# Patient Record
Sex: FEMALE | Race: BLACK OR AFRICAN AMERICAN | Employment: OTHER | URBAN - METROPOLITAN AREA
[De-identification: names, ages, dates, MRNs, and addresses within clinical notes are randomized per-mention and may not be internally consistent; named-entity substitution may affect disease eponyms.]

---

## 2018-06-17 ENCOUNTER — HOSPITAL ENCOUNTER (EMERGENCY)
Age: 68
Discharge: HOME | End: 2018-06-17
Attending: EMERGENCY MEDICINE
Payer: MEDICARE

## 2018-06-17 ENCOUNTER — APPOINTMENT (OUTPATIENT)
Dept: GENERAL RADIOLOGY | Age: 68
End: 2018-06-17
Attending: EMERGENCY MEDICINE
Payer: MEDICARE

## 2018-06-17 VITALS
TEMPERATURE: 98 F | WEIGHT: 237.22 LBS | OXYGEN SATURATION: 95 % | RESPIRATION RATE: 18 BRPM | DIASTOLIC BLOOD PRESSURE: 90 MMHG | HEART RATE: 80 BPM | BODY MASS INDEX: 42.02 KG/M2 | SYSTOLIC BLOOD PRESSURE: 179 MMHG

## 2018-06-17 DIAGNOSIS — M79.672 LEFT FOOT PAIN: Primary | ICD-10-CM

## 2018-06-17 PROCEDURE — 99283 EMERGENCY DEPT VISIT LOW MDM: CPT

## 2018-06-17 PROCEDURE — 74011250637 HC RX REV CODE- 250/637: Performed by: EMERGENCY MEDICINE

## 2018-06-17 PROCEDURE — 73630 X-RAY EXAM OF FOOT: CPT

## 2018-06-17 RX ORDER — SIMVASTATIN 10 MG/1
20 TABLET, FILM COATED ORAL
COMMUNITY

## 2018-06-17 RX ORDER — ACETAMINOPHEN 500 MG
1000 TABLET ORAL
Status: COMPLETED | OUTPATIENT
Start: 2018-06-17 | End: 2018-06-17

## 2018-06-17 RX ORDER — METFORMIN HYDROCHLORIDE 500 MG/1
500 TABLET ORAL
Status: ON HOLD | COMMUNITY
End: 2020-10-07 | Stop reason: SDUPTHER

## 2018-06-17 RX ORDER — POTASSIUM CHLORIDE 750 MG/1
10 TABLET, FILM COATED, EXTENDED RELEASE ORAL 2 TIMES DAILY
COMMUNITY

## 2018-06-17 RX ADMIN — ACETAMINOPHEN 1000 MG: 500 TABLET ORAL at 00:31

## 2018-06-17 NOTE — ED TRIAGE NOTES
Patient complains of posterior left foot pain x 3 weeks. Symptoms started after \"stepping\" wrong per patient.   Patient ambulatory with use of cane on arrival.

## 2018-06-17 NOTE — DISCHARGE INSTRUCTIONS
Foot Pain: Care Instructions  Your Care Instructions  Foot injuries that cause pain and swelling are fairly common. Almost all sports or home repair projects can cause a misstep that ends up as foot pain. Normal wear and tear, especially as you get older, also can cause foot pain. Most minor foot injuries will heal on their own, and home treatment is usually all you need to do. If you have a severe injury, you may need tests and treatment. Follow-up care is a key part of your treatment and safety. Be sure to make and go to all appointments, and call your doctor if you are having problems. It's also a good idea to know your test results and keep a list of the medicines you take. How can you care for yourself at home? · Take pain medicines exactly as directed. ¨ If the doctor gave you a prescription medicine for pain, take it as prescribed. ¨ If you are not taking a prescription pain medicine, ask your doctor if you can take an over-the-counter medicine. · Rest and protect your foot. Take a break from any activity that may cause pain. · Put ice or a cold pack on your foot for 10 to 20 minutes at a time. Put a thin cloth between the ice and your skin. · Prop up the sore foot on a pillow when you ice it or anytime you sit or lie down during the next 3 days. Try to keep it above the level of your heart. This will help reduce swelling. · Your doctor may recommend that you wrap your foot with an elastic bandage. Keep your foot wrapped for as long as your doctor advises. · If your doctor recommends crutches, use them as directed. · Wear roomy footwear. · As soon as pain and swelling end, begin gentle exercises of your foot. Your doctor can tell you which exercises will help. When should you call for help? Call 911 anytime you think you may need emergency care. For example, call if:  ? · Your foot turns pale, white, blue, or cold.    ?Call your doctor now or seek immediate medical care if:  ? · You cannot move or stand on your foot. ? · Your foot looks twisted or out of its normal position. ? · Your foot is not stable when you step down. ? · You have signs of infection, such as:  ¨ Increased pain, swelling, warmth, or redness. ¨ Red streaks leading from the sore area. ¨ Pus draining from a place on your foot. ¨ A fever. ? · Your foot is numb or tingly. ? Watch closely for changes in your health, and be sure to contact your doctor if:  ? · You do not get better as expected. ? · You have bruises from an injury that last longer than 2 weeks. Where can you learn more? Go to http://gwen-sunshine.info/. Enter L189 in the search box to learn more about \"Foot Pain: Care Instructions. \"  Current as of: March 21, 2017  Content Version: 11.4  © 6270-3543 [employer]. Care instructions adapted under license by [employer] (which disclaims liability or warranty for this information). If you have questions about a medical condition or this instruction, always ask your healthcare professional. [address] any warranty or liability for your use of this information.

## 2018-06-17 NOTE — ED NOTES
The patient was discharged home by family  in stable condition. The patient is alert and oriented, in no respiratory distress and discharge vital signs obtained. The patient's diagnosis, condition and treatment were explained. The patient expressed understanding. No prescriptions given. No work/school note given. A discharge plan has been developed. A  was not involved in the process. Aftercare instructions were given. Pt ambulatory out of the ED.

## 2018-06-17 NOTE — ED PROVIDER NOTES
HPI Comments: Bart Aase is a 75 yo F with left foot pain for 3 weeks. She states that 3 weeks ago she felt and heard a pop in her foot as she was stepping down her front steps. Since then she has been having increasing pain. The pain is located on the medial side of her distal 1st metatarsal in the same area of prior bunion surgery 13 years ago. The pain is increased with prolonged walking and improved with elevation. After the original injury occurred she traveled to [facility] for vacation. She states that she tried to keep her foot elevated as much as possible but had done a lot of walking which increased her pain. She has not taken any OTC pain medications because she was not sure what would be safe with her other medications. Past Medical History:   Diagnosis Date    Acid indigestion     or heartburn    Asthma     Constipation     Frequent episodic tension-type headache     Heart disease     High blood pressure     Multiple stiff joints     Shortness of breath     Sinus pressure     Sore throat     Trouble in sleeping     Wheezing        Past Surgical History:   Procedure Laterality Date    BIOPSY BREAST      ENDOSCOPY, COLON, DIAGNOSTIC  05/09         Family History:   Problem Relation Age of Onset    Breast Cancer Mother     Hypertension Mother     Diabetes Father     Coronary Artery Disease Sister     Breast Cancer Sister     Hypertension Brother     Heart Attack Maternal Grandmother     Dementia Paternal Grandmother     Hypertension Brother     Cancer Brother      Pancreas & Liver ca       Social History     Social History    Marital status:      Spouse name: N/A    Number of children: N/A    Years of education: N/A     Occupational History    Not on file.      Social History Main Topics    Smoking status: Never Smoker    Smokeless tobacco: Never Used    Alcohol use No    Drug use: No    Sexual activity: Yes     Partners: Male     Other Topics Concern    Not on file     Social History Narrative         ALLERGIES: Pork/porcine containing products and Pcn [penicillins]    Review of Systems   Constitutional: Negative for fever. HENT: Negative for sore throat. Eyes: Negative for visual disturbance. Respiratory: Negative for cough. Cardiovascular: Negative for chest pain. Gastrointestinal: Negative for abdominal pain. Genitourinary: Negative for dysuria. Musculoskeletal: Positive for arthralgias. Negative for back pain. Skin: Negative for color change and rash. Neurological: Negative for headaches. Vitals:    06/17/18 0015   BP: 179/90   Pulse: 80   Resp: 18   Temp: 98 °F (36.7 °C)   SpO2: 95%   Weight: 107.6 kg (237 lb 3.4 oz)            Physical Exam   Constitutional: She appears well-developed and well-nourished. No distress. HENT:   Head: Normocephalic and atraumatic. Mouth/Throat: Oropharynx is clear and moist.   Eyes: Conjunctivae and EOM are normal.   Neck: Normal range of motion and phonation normal.   Cardiovascular: Normal rate and intact distal pulses. Pulmonary/Chest: Effort normal. No respiratory distress. Abdominal: She exhibits no distension. Musculoskeletal: Normal range of motion. She exhibits no tenderness. Left foot: There is bony tenderness (distal 1st metatarsal). Neurological: She is alert. She is not disoriented. She exhibits normal muscle tone. Skin: Skin is warm and dry. No erythema. Nursing note and vitals reviewed.        MDM      ED Course       Procedures

## 2018-08-18 ENCOUNTER — HOSPITAL ENCOUNTER (EMERGENCY)
Age: 68
Discharge: HOME | End: 2018-08-18
Attending: EMERGENCY MEDICINE | Admitting: EMERGENCY MEDICINE
Payer: MEDICARE

## 2018-08-18 ENCOUNTER — APPOINTMENT (OUTPATIENT)
Dept: GENERAL RADIOLOGY | Age: 68
End: 2018-08-18
Attending: EMERGENCY MEDICINE
Payer: MEDICARE

## 2018-08-18 VITALS
HEART RATE: 61 BPM | TEMPERATURE: 98 F | DIASTOLIC BLOOD PRESSURE: 74 MMHG | SYSTOLIC BLOOD PRESSURE: 138 MMHG | RESPIRATION RATE: 16 BRPM | HEIGHT: 63 IN | OXYGEN SATURATION: 91 % | BODY MASS INDEX: 42.62 KG/M2 | WEIGHT: 240.52 LBS

## 2018-08-18 DIAGNOSIS — R53.83 FATIGUE, UNSPECIFIED TYPE: Primary | ICD-10-CM

## 2018-08-18 DIAGNOSIS — M79.602 LEFT ARM PAIN: ICD-10-CM

## 2018-08-18 LAB
ALBUMIN SERPL-MCNC: 3.4 G/DL (ref 3.5–5)
ALBUMIN/GLOB SERPL: 0.9 {RATIO} (ref 1.1–2.2)
ALP SERPL-CCNC: 84 U/L (ref 45–117)
ALT SERPL-CCNC: 31 U/L (ref 12–78)
ANION GAP SERPL CALC-SCNC: 6 MMOL/L (ref 5–15)
AST SERPL-CCNC: 15 U/L (ref 15–37)
BASOPHILS # BLD: 0.1 K/UL (ref 0–0.1)
BASOPHILS NFR BLD: 1 % (ref 0–1)
BILIRUB SERPL-MCNC: 0.3 MG/DL (ref 0.2–1)
BUN SERPL-MCNC: 15 MG/DL (ref 6–20)
BUN/CREAT SERPL: 16 (ref 12–20)
CALCIUM SERPL-MCNC: 9.4 MG/DL (ref 8.5–10.1)
CHLORIDE SERPL-SCNC: 104 MMOL/L (ref 97–108)
CO2 SERPL-SCNC: 30 MMOL/L (ref 21–32)
COMMENT, HOLDF: NORMAL
CREAT SERPL-MCNC: 0.92 MG/DL (ref 0.55–1.02)
DIFFERENTIAL METHOD BLD: NORMAL
EOSINOPHIL # BLD: 0.4 K/UL (ref 0–0.4)
EOSINOPHIL NFR BLD: 4 % (ref 0–7)
ERYTHROCYTE [DISTWIDTH] IN BLOOD BY AUTOMATED COUNT: 13.5 % (ref 11.5–14.5)
GLOBULIN SER CALC-MCNC: 3.8 G/DL (ref 2–4)
GLUCOSE SERPL-MCNC: 103 MG/DL (ref 65–100)
HCT VFR BLD AUTO: 41.6 % (ref 35–47)
HGB BLD-MCNC: 14.5 G/DL (ref 11.5–16)
LYMPHOCYTES # BLD: 3.3 K/UL (ref 0.8–3.5)
LYMPHOCYTES NFR BLD: 35 % (ref 12–49)
MCH RBC QN AUTO: 31 PG (ref 26–34)
MCHC RBC AUTO-ENTMCNC: 34.9 G/DL (ref 30–36.5)
MCV RBC AUTO: 89.1 FL (ref 80–99)
MONOCYTES # BLD: 0.8 K/UL (ref 0–1)
MONOCYTES NFR BLD: 9 % (ref 5–13)
NEUTS SEG # BLD: 4.9 K/UL (ref 1.8–8)
NEUTS SEG NFR BLD: 51 % (ref 32–75)
PLATELET # BLD AUTO: 285 K/UL (ref 150–400)
PMV BLD AUTO: 10.6 FL (ref 8.9–12.9)
POTASSIUM SERPL-SCNC: 3.3 MMOL/L (ref 3.5–5.1)
PROT SERPL-MCNC: 7.2 G/DL (ref 6.4–8.2)
RBC # BLD AUTO: 4.67 M/UL (ref 3.8–5.2)
SAMPLES BEING HELD,HOLD: NORMAL
SODIUM SERPL-SCNC: 140 MMOL/L (ref 136–145)
TROPONIN I SERPL-MCNC: <0.05 NG/ML
WBC # BLD AUTO: 9.4 K/UL (ref 3.6–11)
XXWBCSUS: 0

## 2018-08-18 PROCEDURE — 80053 COMPREHEN METABOLIC PANEL: CPT | Performed by: EMERGENCY MEDICINE

## 2018-08-18 PROCEDURE — 71046 X-RAY EXAM CHEST 2 VIEWS: CPT

## 2018-08-18 PROCEDURE — 36415 COLL VENOUS BLD VENIPUNCTURE: CPT | Performed by: EMERGENCY MEDICINE

## 2018-08-18 PROCEDURE — 99285 EMERGENCY DEPT VISIT HI MDM: CPT

## 2018-08-18 PROCEDURE — 93005 ELECTROCARDIOGRAM TRACING: CPT

## 2018-08-18 PROCEDURE — 85025 COMPLETE CBC W/AUTO DIFF WBC: CPT | Performed by: EMERGENCY MEDICINE

## 2018-08-18 PROCEDURE — 84484 ASSAY OF TROPONIN QUANT: CPT | Performed by: EMERGENCY MEDICINE

## 2018-08-18 RX ORDER — OMEPRAZOLE 20 MG/1
20 CAPSULE, DELAYED RELEASE ORAL DAILY
COMMUNITY

## 2018-08-18 NOTE — ED TRIAGE NOTES
Pt ambulates to treatment area she states that for past week she has been feeling weak and fatigued. She states that for 3 days she has had pain in her left shoulder blade and then yesterday she had some throbbing pain in the left lower arm that caused her to take off her watch. She has had a small amount of SOB with the pain. Pt denies any chest pain, N/V or diaphoresis with the pain.

## 2018-08-18 NOTE — ED NOTES
Pt given discharge instructions by Dr Conner Echeverria she verbalizes an understanding pt stable at time of discharge ambulates to lobby with family

## 2018-08-18 NOTE — DISCHARGE INSTRUCTIONS
Fatigue: Care Instructions  Your Care Instructions    Fatigue is a feeling of tiredness, exhaustion, or lack of energy. You may feel fatigue because of too much or not enough activity. It can also come from stress, lack of sleep, boredom, and poor diet. Many medical problems, such as viral infections, can cause fatigue. Emotional problems, especially depression, are often the cause of fatigue. Fatigue is most often a symptom of another problem. Treatment for fatigue depends on the cause. For example, if you have fatigue because you have a certain health problem, treating this problem also treats your fatigue. If depression or anxiety is the cause, treatment may help. Follow-up care is a key part of your treatment and safety. Be sure to make and go to all appointments, and call your doctor if you are having problems. It's also a good idea to know your test results and keep a list of the medicines you take. How can you care for yourself at home? · Get regular exercise. But don't overdo it. Go back and forth between rest and exercise. · Get plenty of rest.  · Eat a healthy diet. Do not skip meals, especially breakfast.  · Reduce your use of caffeine, tobacco, and alcohol. Caffeine is most often found in coffee, tea, cola drinks, and chocolate. · Limit medicines that can cause fatigue. This includes tranquilizers and cold and allergy medicines. When should you call for help? Watch closely for changes in your health, and be sure to contact your doctor if:    · You have new symptoms such as fever or a rash.     · Your fatigue gets worse.     · You have been feeling down, depressed, or hopeless. Or you may have lost interest in things that you usually enjoy.     · You are not getting better as expected. Where can you learn more? Go to http://gwen-sunshine.info/. Enter Z457 in the search box to learn more about \"Fatigue: Care Instructions. \"  Current as of: November 20, 2017  Content Version: 11.7  © 3468-7201 [employer]. Care instructions adapted under license by [employer] (which disclaims liability or warranty for this information). If you have questions about a medical condition or this instruction, always ask your healthcare professional. [address] any warranty or liability for your use of this information.

## 2018-08-18 NOTE — ED PROVIDER NOTES
HPI Comments: Hx HTN, DM, hyperlipidemia, \"angina\", TIA, GERD, asthma; presents with a week long hx of fatigue; unclear etiology; she says she has felt this way in the past as well; she reports intermittent left arm pain since yesterday; she has been having fleeting, \"sticking\" chest pains; she has upper back pain; denies dyspnea. Her  just lost his sister and insisted patient come in because \"he didn't want to lose me too. \"  She reports a remote hx of a cardiac cath that showed no blockages. Patient is a 79 y.o. female presenting with fatigue. Fatigue          Past Medical History:   Diagnosis Date    Acid indigestion     or heartburn    Asthma     Constipation     Frequent episodic tension-type headache     Heart disease     High blood pressure     Multiple stiff joints     Shortness of breath     Sinus pressure     Sore throat     Trouble in sleeping     Wheezing        Past Surgical History:   Procedure Laterality Date    BIOPSY BREAST      ENDOSCOPY, COLON, DIAGNOSTIC  05/09         Family History:   Problem Relation Age of Onset    Breast Cancer Mother     Hypertension Mother     Diabetes Father     Coronary Artery Disease Sister     Breast Cancer Sister     Hypertension Brother     Heart Attack Maternal Grandmother     Dementia Paternal Grandmother     Hypertension Brother     Cancer Brother      Pancreas & Liver ca       Social History     Social History    Marital status:      Spouse name: N/A    Number of children: N/A    Years of education: N/A     Occupational History    Not on file.      Social History Main Topics    Smoking status: Never Smoker    Smokeless tobacco: Never Used    Alcohol use No    Drug use: No    Sexual activity: Yes     Partners: Male     Other Topics Concern    Not on file     Social History Narrative         ALLERGIES: Pork/porcine containing products and Pcn [penicillins]    Review of Systems   Constitutional: Positive for fatigue. All other systems reviewed and are negative. There were no vitals filed for this visit. Physical Exam   Constitutional: She is oriented to person, place, and time. She appears well-developed and well-nourished. No distress. Mildly tearful   HENT:   Head: Normocephalic and atraumatic. Eyes: Conjunctivae are normal. No scleral icterus. Neck: Neck supple. No tracheal deviation present. Cardiovascular: Normal rate, regular rhythm, normal heart sounds and intact distal pulses. Exam reveals no gallop and no friction rub. No murmur heard. Pulmonary/Chest: Effort normal and breath sounds normal. She has no wheezes. She has no rales. Abdominal: Soft. She exhibits no distension. There is no tenderness. There is no rebound and no guarding. Musculoskeletal: She exhibits no edema. Neurological: She is alert and oriented to person, place, and time. Skin: Skin is warm and dry. No rash noted. Psychiatric: She has a normal mood and affect. Nursing note and vitals reviewed. [facility]      ED Course       Procedures    EKG: NSR; rate - 66; normal ST,T.  Candace Larios MD  1:53 PM     Progress Note:  Results, treatment, and follow up plan have been discussed with patient. Questions were answered. Candace Larios MD  2:17 PM    A/P: fatigue and left arm pain - unclear etiology; no exertional sx's; reassuring appearance and exam; VSS; CBC, CMP, trop, EKG, CXR ok  home with PCP/cards f/u.   Candace Larios MD  2:18 PM

## 2018-08-22 LAB
ATRIAL RATE: 66 BPM
CALCULATED P AXIS, ECG09: 43 DEGREES
CALCULATED R AXIS, ECG10: -9 DEGREES
CALCULATED T AXIS, ECG11: 78 DEGREES
DIAGNOSIS, 93000: NORMAL
P-R INTERVAL, ECG05: 182 MS
Q-T INTERVAL, ECG07: 410 MS
QRS DURATION, ECG06: 92 MS
QTC CALCULATION (BEZET), ECG08: 429 MS
VENTRICULAR RATE, ECG03: 66 BPM

## 2018-09-17 ENCOUNTER — HOSPITAL ENCOUNTER (EMERGENCY)
Age: 68
Discharge: HOME | End: 2018-09-17
Attending: EMERGENCY MEDICINE
Payer: MEDICARE

## 2018-09-17 ENCOUNTER — APPOINTMENT (OUTPATIENT)
Dept: GENERAL RADIOLOGY | Age: 68
End: 2018-09-17
Attending: EMERGENCY MEDICINE
Payer: MEDICARE

## 2018-09-17 VITALS
TEMPERATURE: 98.6 F | WEIGHT: 238.1 LBS | RESPIRATION RATE: 16 BRPM | BODY MASS INDEX: 42.19 KG/M2 | OXYGEN SATURATION: 95 % | DIASTOLIC BLOOD PRESSURE: 81 MMHG | HEIGHT: 63 IN | SYSTOLIC BLOOD PRESSURE: 162 MMHG | HEART RATE: 71 BPM

## 2018-09-17 DIAGNOSIS — J20.9 ACUTE BRONCHITIS, UNSPECIFIED ORGANISM: Primary | ICD-10-CM

## 2018-09-17 DIAGNOSIS — J98.01 ACUTE BRONCHOSPASM: ICD-10-CM

## 2018-09-17 LAB
ALBUMIN SERPL-MCNC: 3.5 G/DL (ref 3.5–5)
ALBUMIN/GLOB SERPL: 0.8 {RATIO} (ref 1.1–2.2)
ALP SERPL-CCNC: 89 U/L (ref 45–117)
ALT SERPL-CCNC: 37 U/L (ref 12–78)
ANION GAP SERPL CALC-SCNC: 9 MMOL/L (ref 5–15)
APPEARANCE UR: CLEAR
AST SERPL-CCNC: 28 U/L (ref 15–37)
BACTERIA URNS QL MICRO: ABNORMAL /HPF
BASOPHILS # BLD: 0 K/UL (ref 0–0.1)
BASOPHILS NFR BLD: 0 % (ref 0–1)
BILIRUB SERPL-MCNC: 0.3 MG/DL (ref 0.2–1)
BILIRUB UR QL: NEGATIVE
BUN SERPL-MCNC: 14 MG/DL (ref 6–20)
BUN/CREAT SERPL: 13 (ref 12–20)
CALCIUM SERPL-MCNC: 9.5 MG/DL (ref 8.5–10.1)
CHLORIDE SERPL-SCNC: 103 MMOL/L (ref 97–108)
CO2 SERPL-SCNC: 29 MMOL/L (ref 21–32)
COLOR UR: ABNORMAL
CREAT SERPL-MCNC: 1.04 MG/DL (ref 0.55–1.02)
DIFFERENTIAL METHOD BLD: NORMAL
EOSINOPHIL # BLD: 0.4 K/UL (ref 0–0.4)
EOSINOPHIL NFR BLD: 4 % (ref 0–7)
EPITH CASTS URNS QL MICRO: ABNORMAL /LPF
ERYTHROCYTE [DISTWIDTH] IN BLOOD BY AUTOMATED COUNT: 13.4 % (ref 11.5–14.5)
GLOBULIN SER CALC-MCNC: 4.3 G/DL (ref 2–4)
GLUCOSE SERPL-MCNC: 99 MG/DL (ref 65–100)
GLUCOSE UR STRIP.AUTO-MCNC: NEGATIVE MG/DL
HCT VFR BLD AUTO: 41.7 % (ref 35–47)
HGB BLD-MCNC: 14.5 G/DL (ref 11.5–16)
HGB UR QL STRIP: NEGATIVE
KETONES UR QL STRIP.AUTO: NEGATIVE MG/DL
LEUKOCYTE ESTERASE UR QL STRIP.AUTO: ABNORMAL
LYMPHOCYTES # BLD: 2.9 K/UL (ref 0.8–3.5)
LYMPHOCYTES NFR BLD: 30 % (ref 12–49)
MCH RBC QN AUTO: 31.1 PG (ref 26–34)
MCHC RBC AUTO-ENTMCNC: 34.8 G/DL (ref 30–36.5)
MCV RBC AUTO: 89.5 FL (ref 80–99)
MONOCYTES # BLD: 1 K/UL (ref 0–1)
MONOCYTES NFR BLD: 10 % (ref 5–13)
NEUTS SEG # BLD: 5.5 K/UL (ref 1.8–8)
NEUTS SEG NFR BLD: 56 % (ref 32–75)
NITRITE UR QL STRIP.AUTO: NEGATIVE
PH UR STRIP: 6.5 [PH] (ref 5–8)
PLATELET # BLD AUTO: 292 K/UL (ref 150–400)
PMV BLD AUTO: 10.5 FL (ref 8.9–12.9)
POTASSIUM SERPL-SCNC: 3.6 MMOL/L (ref 3.5–5.1)
PROT SERPL-MCNC: 7.8 G/DL (ref 6.4–8.2)
PROT UR STRIP-MCNC: NEGATIVE MG/DL
RBC # BLD AUTO: 4.66 M/UL (ref 3.8–5.2)
RBC #/AREA URNS HPF: ABNORMAL /HPF (ref 0–5)
SODIUM SERPL-SCNC: 141 MMOL/L (ref 136–145)
SP GR UR REFRACTOMETRY: 1.01 (ref 1–1.03)
UA: UC IF INDICATED,UAUC: ABNORMAL
UROBILINOGEN UR QL STRIP.AUTO: 0.2 EU/DL (ref 0.2–1)
WBC # BLD AUTO: 9.8 K/UL (ref 3.6–11)
WBC URNS QL MICRO: ABNORMAL /HPF (ref 0–4)
XXWBCSUS: 0
YEAST BUDDING URNS QL: PRESENT

## 2018-09-17 PROCEDURE — A9270 NON-COVERED ITEM OR SERVICE: HCPCS | Performed by: EMERGENCY MEDICINE

## 2018-09-17 PROCEDURE — 85025 COMPLETE CBC W/AUTO DIFF WBC: CPT | Performed by: EMERGENCY MEDICINE

## 2018-09-17 PROCEDURE — 94640 AIRWAY INHALATION TREATMENT: CPT

## 2018-09-17 PROCEDURE — 36415 COLL VENOUS BLD VENIPUNCTURE: CPT | Performed by: EMERGENCY MEDICINE

## 2018-09-17 PROCEDURE — 87106 FUNGI IDENTIFICATION YEAST: CPT | Performed by: EMERGENCY MEDICINE

## 2018-09-17 PROCEDURE — 99284 EMERGENCY DEPT VISIT MOD MDM: CPT

## 2018-09-17 PROCEDURE — 71046 X-RAY EXAM CHEST 2 VIEWS: CPT

## 2018-09-17 PROCEDURE — 74011636637 HC RX REV CODE- 636/637: Performed by: EMERGENCY MEDICINE

## 2018-09-17 PROCEDURE — 87086 URINE CULTURE/COLONY COUNT: CPT | Performed by: EMERGENCY MEDICINE

## 2018-09-17 PROCEDURE — 81001 URINALYSIS AUTO W/SCOPE: CPT | Performed by: EMERGENCY MEDICINE

## 2018-09-17 PROCEDURE — 77030013140 HC MSK NEB VYRM -A

## 2018-09-17 PROCEDURE — 74011000250 HC RX REV CODE- 250: Performed by: EMERGENCY MEDICINE

## 2018-09-17 PROCEDURE — 80053 COMPREHEN METABOLIC PANEL: CPT | Performed by: EMERGENCY MEDICINE

## 2018-09-17 RX ORDER — CIPROFLOXACIN 500 MG/1
500 TABLET ORAL 2 TIMES DAILY
COMMUNITY
End: 2019-06-04

## 2018-09-17 RX ORDER — CARVEDILOL 25 MG/1
25 TABLET ORAL 2 TIMES DAILY WITH MEALS
COMMUNITY

## 2018-09-17 RX ORDER — PREDNISONE 20 MG/1
20 TABLET ORAL DAILY
Qty: 5 TAB | Refills: 0 | Status: SHIPPED | OUTPATIENT
Start: 2018-09-17 | End: 2018-09-22

## 2018-09-17 RX ORDER — PREDNISONE 20 MG/1
60 TABLET ORAL
Status: COMPLETED | OUTPATIENT
Start: 2018-09-17 | End: 2018-09-17

## 2018-09-17 RX ORDER — DOXYCYCLINE 100 MG/1
100 CAPSULE ORAL 2 TIMES DAILY
Qty: 14 CAP | Refills: 0 | Status: SHIPPED | OUTPATIENT
Start: 2018-09-17 | End: 2019-06-04

## 2018-09-17 RX ORDER — ALBUTEROL SULFATE 90 UG/1
2 AEROSOL, METERED RESPIRATORY (INHALATION)
Qty: 1 INHALER | Refills: 1 | Status: SHIPPED | OUTPATIENT
Start: 2018-09-17 | End: 2018-09-25

## 2018-09-17 RX ORDER — ALBUTEROL SULFATE 0.83 MG/ML
2.5 SOLUTION RESPIRATORY (INHALATION)
Status: COMPLETED | OUTPATIENT
Start: 2018-09-17 | End: 2018-09-17

## 2018-09-17 RX ADMIN — ALBUTEROL SULFATE 2.5 MG: 2.5 SOLUTION RESPIRATORY (INHALATION) at 11:21

## 2018-09-17 RX ADMIN — PREDNISONE 60 MG: 20 TABLET ORAL at 11:07

## 2018-09-17 RX ADMIN — IPRATROPIUM BROMIDE 1 DOSE: 0.5 SOLUTION RESPIRATORY (INHALATION) at 11:08

## 2018-09-17 NOTE — ED TRIAGE NOTES
Pt presents to ED with c/o 5 day hx of cough productive of yellow sputum. Pt is currently being treated for UTI. Pt with c/o dyspnea with exertion. Pt also with c/o nausea and vomiting last night. Pt describes post tussive vomiting.

## 2018-09-17 NOTE — ED NOTES
IV access established and blood samples obtained for ordered testing. Patient tolerated well. Patient voices no further requests or concerns at this time. Call bell in reach. Patient connected to monitor x3 and vital signs updated. Will continue to monitor.

## 2018-09-17 NOTE — ED PROVIDER NOTES
HPI Comments: The patient has a five-day history of cough and sinus congestion. She also has had 3 episodes of vomiting and is beginning to feel generalized weakness. She is unsure if she has had a fever. She has occasional sputum production. She denies chest pain, abdominal pain or shortness of breath. She also says that she has been treated for the past several days for a UTI with Cipro. She has some urinary frequency but no dysuria. Patient is a 79 y.o. female presenting with cough. Cough Associated symptoms include rhinorrhea, nausea and vomiting. Pertinent negatives include no chest pain, no headaches, no shortness of breath, no wheezing and no confusion. Past Medical History:  
Diagnosis Date  Acid indigestion   
 or heartburn  Asthma  Constipation  Diabetes ([address]  Frequent episodic tension-type headache   
 Heart disease  High blood pressure  Multiple stiff joints  Shortness of breath  Sinus pressure  Sore throat  Trouble in sleeping  Wheezing Past Surgical History:  
Procedure Laterality Date  BIOPSY BREAST  CARDIAC SURG PROCEDURE UNLIST    
 checked for blockages  ENDOSCOPY, COLON, DIAGNOSTIC  05/09 Family History:  
Problem Relation Age of Onset  Breast Cancer Mother  Hypertension Mother  Diabetes Father  Coronary Artery Disease Sister  Breast Cancer Sister  Hypertension Brother  Heart Attack Maternal Grandmother  Dementia Paternal Grandmother  Hypertension Brother  Cancer Brother Pancreas & Liver ca Social History Social History  Marital status:  Spouse name: N/A  
 Number of children: N/A  
 Years of education: N/A Occupational History  Not on file. Social History Main Topics  Smoking status: Never Smoker  Smokeless tobacco: Never Used  Alcohol use No  
 Drug use: No  
 Sexual activity: Yes  
  Partners: Male Other Topics Concern  Not on file Social History Narrative ALLERGIES: Pork/porcine containing products and Pcn [penicillins] Review of Systems Constitutional: Negative for fever. HENT: Positive for rhinorrhea. Eyes: Negative for visual disturbance. Respiratory: Positive for cough. Negative for shortness of breath and wheezing. Cardiovascular: Negative for chest pain and leg swelling. Gastrointestinal: Positive for nausea and vomiting. Negative for abdominal pain and diarrhea. Genitourinary: Positive for frequency. Negative for dysuria. Musculoskeletal: Negative. Negative for back pain and neck stiffness. Skin: Negative for rash. Neurological: Negative. Negative for syncope and headaches. Psychiatric/Behavioral: Negative for confusion. All other systems reviewed and are negative. Vitals:  
 09/17/18 1042 BP: (!) 205/90 Pulse: 80 Resp: 18 Temp: 97.9 °F (36.6 °C) SpO2: 97% Weight: 108 kg (238 lb 1.6 oz) Height: 5' 3\" (1.6 m) Physical Exam  
Constitutional: She appears well-developed and well-nourished. No distress. HENT:  
Head: Normocephalic. Eyes: Pupils are equal, round, and reactive to light. Neck: Normal range of motion. Cardiovascular: Normal rate and regular rhythm. No murmur heard. Pulmonary/Chest: Effort normal. No respiratory distress. She has wheezes. Abdominal: Soft. There is no tenderness. Musculoskeletal: Normal range of motion. She exhibits no edema. Neurological: She is alert. She has normal strength. No cranial nerve deficit. Skin: Skin is warm and dry. Psychiatric: She has a normal mood and affect. Her behavior is normal.  
Nursing note and vitals reviewed. [facility] 
 
 
ED Course Procedures 1230 pm- pt better. Wheezing minmal at this point. sats 95 %.

## 2018-09-17 NOTE — ED NOTES
Patient has completed neb treatments. Expiratory wheezes noted with rhonchi in bases. Improved lung sounds with medication administration noted.

## 2018-09-17 NOTE — DISCHARGE INSTRUCTIONS
Bronchitis: Care Instructions  Your Care Instructions    Bronchitis is inflammation of the bronchial tubes, which carry air to the lungs. The tubes swell and produce mucus, or phlegm. The mucus and inflamed bronchial tubes make you cough. You may have trouble breathing. Most cases of bronchitis are caused by viruses like those that cause colds. Antibiotics usually do not help and they may be harmful. Bronchitis usually develops rapidly and lasts about 2 to 3 weeks in otherwise healthy people. Follow-up care is a key part of your treatment and safety. Be sure to make and go to all appointments, and call your doctor if you are having problems. It's also a good idea to know your test results and keep a list of the medicines you take. How can you care for yourself at home? · Take all medicines exactly as prescribed. Call your doctor if you think you are having a problem with your medicine. · Get some extra rest.  · Take an over-the-counter pain medicine, such as acetaminophen (Tylenol), ibuprofen (Advil, Motrin), or naproxen (Aleve) to reduce fever and relieve body aches. Read and follow all instructions on the label. · Do not take two or more pain medicines at the same time unless the doctor told you to. Many pain medicines have acetaminophen, which is Tylenol. Too much acetaminophen (Tylenol) can be harmful. · Take an over-the-counter cough medicine that contains dextromethorphan to help quiet a dry, hacking cough so that you can sleep. Avoid cough medicines that have more than one active ingredient. Read and follow all instructions on the label. · Breathe moist air from a humidifier, hot shower, or sink filled with hot water. The heat and moisture will thin mucus so you can cough it out. · Do not smoke. Smoking can make bronchitis worse. If you need help quitting, talk to your doctor about stop-smoking programs and medicines. These can increase your chances of quitting for good.   When should you call for help? Call 911 anytime you think you may need emergency care. For example, call if:    · You have severe trouble breathing.    Call your doctor now or seek immediate medical care if:    · You have new or worse trouble breathing.     · You cough up dark brown or bloody mucus (sputum).     · You have a new or higher fever.     · You have a new rash.    Watch closely for changes in your health, and be sure to contact your doctor if:    · You cough more deeply or more often, especially if you notice more mucus or a change in the color of your mucus.     · You are not getting better as expected. Where can you learn more? Go to http://gwen-sunshine.info/. Enter H333 in the search box to learn more about \"Bronchitis: Care Instructions. \"  Current as of: December 6, 2017  Content Version: 11.7  © 9394-3176 [employer]. Care instructions adapted under license by [employer] (which disclaims liability or warranty for this information). If you have questions about a medical condition or this instruction, always ask your healthcare professional. [place] [address] any warranty or liability for your use of this information. Wheezing or Bronchoconstriction: Care Instructions  Your Care Instructions  Wheezing is a whistling noise made during breathing. It occurs when the small airways, or bronchial tubes, that lead to your lungs swell or contract (spasm) and become narrow. This narrowing is called bronchoconstriction. When your airways constrict, it is hard for air to pass through and this makes it hard for you to breathe. Wheezing and bronchoconstriction can be caused by many problems, including:  · An infection such as the flu or a cold. · Allergies such as hay fever. · Diseases such as asthma or chronic obstructive pulmonary disease. · Smoking. Treatment for your wheezing depends on what is causing the problem.  Your wheezing may get better without treatment. But you may need to pay attention to things that cause your wheezing and avoid them. Or you may need medicine to help treat the wheezing and to reduce the swelling or to relieve spasms in your lungs. Follow-up care is a key part of your treatment and safety. Be sure to make and go to all appointments, and call your doctor if you are having problems. It is also a good idea to know your test results and keep a list of the medicines you take. How can you care for yourself at home? · Take your medicine exactly as prescribed. Call your doctor if you think you are having a problem with your medicine. You will get more details on the specific medicine your doctor prescribes. · If your doctor prescribed antibiotics, take them as directed. Do not stop taking them just because you feel better. You need to take the full course of antibiotics. · Breathe moist air from a humidifier, hot shower, or sink filled with hot water. This may help ease your symptoms and make it easier for you to breathe. · If you have congestion in your nose and throat, drinking plenty of fluids, especially hot fluids, may help relieve your symptoms. If you have kidney, heart, or liver disease and have to limit fluids, talk with your doctor before you increase the amount of fluids you drink. · If you have mucus in your airways, it may help to breathe deeply and cough. · Do not smoke or allow others to smoke around you. Smoking can make your wheezing worse. If you need help quitting, talk to your doctor about stop-smoking programs and medicines. These can increase your chances of quitting for good. · Avoid things that may cause your wheezing. These may include colds, smoke, air pollution, dust, pollen, pets, cockroaches, stress, and cold air. When should you call for help? Call 911 anytime you think you may need emergency care. For example, call if:    · You have severe trouble breathing.     · You passed out (lost consciousness).  Call your doctor now or seek immediate medical care if:    · You cough up yellow, dark brown, or bloody mucus (sputum).     · You have new or worse shortness of breath.     · Your wheezing is not getting better or it gets worse after you start taking your medicine.    Watch closely for changes in your health, and be sure to contact your doctor if:    · You do not get better as expected. Where can you learn more? Go to http://gwen-sunshine.info/. Enter 454 4558 in the search box to learn more about \"Wheezing or Bronchoconstriction: Care Instructions. \"  Current as of: December 6, 2017  Content Version: 11.7  © 8709-5185 [employer]. Care instructions adapted under license by [employer] (which disclaims liability or warranty for this information). If you have questions about a medical condition or this instruction, always ask your healthcare professional. [address] any warranty or liability for your use of this information.

## 2018-09-17 NOTE — ED NOTES
The patient was discharged home by Dr. Hector Corado in stable condition. The patient is alert and oriented, in no respiratory distress and discharge vital signs obtained. The patient's diagnosis, condition and treatment were explained. The patient expressed understanding. Three prescriptions given. No work/school note given. A discharge plan has been developed. A  was not involved in the process. Aftercare instructions were given. Pt ambulatory out of the ED. 2. The status of comorbities. (See ED/admit documents)

## 2018-09-17 NOTE — ED NOTES
Patient ambulatory to restroom to obtain ordered urine sample. Gait steady. Patient updated regarding plan of care and associated time constraints. Patient verbalizes understanding and agreement. Call bell in reach. Will continue to monitor.

## 2018-09-20 LAB
BACTERIA SPEC CULT: ABNORMAL
CC UR VC: ABNORMAL
SERVICE CMNT-IMP: ABNORMAL

## 2018-09-25 ENCOUNTER — OFFICE VISIT (OUTPATIENT)
Dept: CARDIOLOGY CLINIC | Age: 68
End: 2018-09-25

## 2018-09-25 VITALS
HEIGHT: 63 IN | BODY MASS INDEX: 42.35 KG/M2 | DIASTOLIC BLOOD PRESSURE: 90 MMHG | RESPIRATION RATE: 16 BRPM | WEIGHT: 239 LBS | OXYGEN SATURATION: 97 % | SYSTOLIC BLOOD PRESSURE: 140 MMHG | HEART RATE: 72 BPM

## 2018-09-25 DIAGNOSIS — I10 ESSENTIAL HYPERTENSION: ICD-10-CM

## 2018-09-25 DIAGNOSIS — G47.33 OSA (OBSTRUCTIVE SLEEP APNEA): ICD-10-CM

## 2018-09-25 DIAGNOSIS — R07.89 CHEST DISCOMFORT: Primary | ICD-10-CM

## 2018-09-25 DIAGNOSIS — E11.9 CONTROLLED TYPE 2 DIABETES MELLITUS WITHOUT COMPLICATION, WITHOUT LONG-TERM CURRENT USE OF INSULIN (HCC): ICD-10-CM

## 2018-09-25 RX ORDER — CYCLOBENZAPRINE HCL 10 MG
TABLET ORAL
COMMUNITY

## 2018-09-25 RX ORDER — TRIAMCINOLONE ACETONIDE 1 MG/G
CREAM TOPICAL 2 TIMES DAILY
COMMUNITY
End: 2020-05-19

## 2018-09-25 NOTE — PROGRESS NOTES
Patient has no cardiac complaints today.     Visit Vitals    /90 (BP 1 Location: Right arm, BP Patient Position: Sitting)    Pulse 72    Resp 16    Ht 5' 3\" (1.6 m)    Wt 239 lb (108.4 kg)    SpO2 97%    BMI 42.34 kg/m2       Patient was in the ER 9/17/18

## 2018-09-25 NOTE — PROGRESS NOTES
LAST OFFICE VISIT : Visit date not found      No diagnosis found. Marielena Sharma is a 79 y.o. female with HTN, angina, asthma, elevated cholesterol and obesity referred for follow up from ED. Cardiac risk factors: elevated cholesterol, obesity, hypertension  I have personally obtained the history from the patient. HISTORY OF PRESENTING ILLNESS     She was in ED for fatigue. Her BP in ED was 205/90. Was seen for fatigue and left arm pain,  \"sticky sensation in upper back. \"  Cardiac cath of coronary artery. Troponin was negative at the time as well as her chest X-Ray. Her EKG at ED was sinus rhythm. Last cardiac test was in 2013. Pt has seen a cardiologist for chest pain 6 to 7 years ago, but is unsure of who her cardiologist was. Pt states that she felt an abnormal sensation on her chest when she was in ED, but she is unsure of how to describe it. Pt uses CPAP religiously for her sleep apnea but has stopped using it two weeks ago as she states she was congested. Pt was on prednisone and antibiotics for her upper respiratory infection. Pt cannot recall her last HB A1C level but has had it checked. Pt has been taking medication for diabetes for two years. Pt states that her diabetes has been under control according to her PCP. Pt had a physical exam and blood work recently. Pt does not exercise and she is retired, where her only physical activity is to take care of her grandson. Pt does not eat healthy. The patient denies shortness of breath, orthopnea, PND, LE edema, palpitations, syncope, presyncope or fatigue.          ACTIVE PROBLEM LIST     Patient Active Problem List    Diagnosis Date Noted    HTN (hypertension) 07/06/2011    Angina 07/06/2011    Asthma 07/06/2011    Obesity 07/06/2011    Elevated cholesterol 07/06/2011    HSV-2 (herpes simplex virus 2) infection 07/06/2011    Osteopenia 07/06/2011           PAST MEDICAL HISTORY     Past Medical History:   Diagnosis Date    Acid indigestion     or heartburn    Asthma     Constipation     Diabetes ([facility] [address]     Frequent episodic tension-type headache     Heart disease     High blood pressure     Multiple stiff joints     Shortness of breath     Sinus pressure     Sore throat     Trouble in sleeping     Wheezing            PAST SURGICAL HISTORY     Past Surgical History:   Procedure Laterality Date    BIOPSY BREAST      CARDIAC SURG PROCEDURE UNLIST      checked for blockages    ENDOSCOPY, COLON, DIAGNOSTIC  05/09          ALLERGIES     Allergies   Allergen Reactions    Pork/Porcine Containing Products Angioedema    Pcn [Penicillins] Rash          FAMILY HISTORY     Family History   Problem Relation Age of Onset    Breast Cancer Mother     Hypertension Mother     Diabetes Father     Coronary Artery Disease Sister     Breast Cancer Sister     Hypertension Brother     Heart Attack Maternal Grandmother     Dementia Paternal Grandmother     Hypertension Brother     Cancer Brother      Pancreas & Liver ca    negative for cardiac disease       SOCIAL HISTORY     Social History     Social History    Marital status:      Spouse name: N/A    Number of children: N/A    Years of education: N/A     Social History Main Topics    Smoking status: Never Smoker    Smokeless tobacco: Never Used    Alcohol use No    Drug use: No    Sexual activity: Yes     Partners: Male     Other Topics Concern    None     Social History Narrative         MEDICATIONS     Current Outpatient Prescriptions   Medication Sig    cyclobenzaprine (FLEXERIL) 10 mg tablet Take  by mouth three (3) times daily as needed for Muscle Spasm(s).  triamcinolone acetonide (KENALOG) 0.1 % topical cream Apply  to affected area two (2) times a day. use thin layer    B.infantis-B.ani-B.long-B.bifi (PROBIOTIC 4X) 10-15 mg TbEC Take  by mouth.  omega 3-dha-epa-fish oil 100-160-1,000 mg cap Take  by mouth.     carvedilol (COREG) 25 mg tablet Take 25 mg by mouth two (2) times daily (with meals).  albuterol (PROVENTIL HFA, VENTOLIN HFA, PROAIR HFA) 90 mcg/actuation inhaler Take 2 Puffs by inhalation every six (6) hours as needed for Wheezing for up to 7 days.  omeprazole (PRILOSEC) 20 mg capsule Take 20 mg by mouth daily.  simvastatin (ZOCOR) 10 mg tablet Take 20 mg by mouth nightly.  metFORMIN (GLUCOPHAGE) 500 mg tablet Take 500 mg by mouth daily (with breakfast).  potassium chloride SR (KLOR-CON 10) 10 mEq tablet Take 10 mEq by mouth two (2) times a day.  nortriptyline (PAMELOR) 25 mg capsule Take 1 Cap by mouth nightly.  diltiazem CD (CARDIZEM CD) 240 mg ER capsule Take 300 mg by mouth daily.  lisinopril-hydrochlorothiazide (PRINZIDE, ZESTORETIC) 20-12.5 mg per tablet Take 2 Tabs by mouth daily.  Cholecalciferol, Vitamin D3, (VITAMIN D) 1,000 unit Cap Take 2,000 Units by mouth.  aspirin 81 mg tablet Take 81 mg by mouth.  ciprofloxacin HCl (CIPRO) 500 mg tablet Take 500 mg by mouth two (2) times a day.  doxycycline (MONODOX) 100 mg capsule Take 1 Cap by mouth two (2) times a day. No current facility-administered medications for this visit. I have reviewed the nurses notes, vitals, problem list, allergy list, medical history, family, social history and medications. REVIEW OF SYMPTOMS      General: Pt denies excessive weight gain or loss. Pt is able to conduct ADL's  HEENT: Denies blurred vision, headaches, hearing loss, epistaxis and difficulty swallowing. Respiratory: Denies cough, congestion, shortness of breath, BERMUDEZ, wheezing or stridor.   Cardiovascular: Denies precordial pain, palpitations, edema or PND  Gastrointestinal: Denies poor appetite, indigestion, abdominal pain or blood in stool  Genitourinary: Denies hematuria, dysuria, increased urinary frequency  Musculoskeletal: Denies joint pain or swelling from muscles or joints  Neurologic: Denies tremor, paresthesias, headache, or sensory motor disturbance  Psychiatric: Denies confusion, insomnia, depression  Integumentray: Denies rash, itching or ulcers. Hematologic: Denies easy bruising, bleeding     PHYSICAL EXAMINATION      Vitals:    09/25/18 0950   BP: 140/90   Pulse: 72   Resp: 16   SpO2: 97%   Weight: 239 lb (108.4 kg)   Height: 5' 3\" (1.6 m)   BP recheck:   General: Well developed, in no acute distress. overweight  HEENT: No jaundice, oral mucosa moist, no oral ulcers  Neck: Supple, no stiffness, no lymphadenopathy, supple  Heart:  Normal S1/S2 negative S3 or S4. Regular, no murmur, gallop or rub, no jugular venous distention  Respiratory: Clear bilaterally x 4, no wheezing or rales  Abdomen:   Soft, non-tender, bowel sounds are active.   Extremities:  No edema, normal cap refill, no cyanosis. Musculoskeletal: No clubbing, no deformities  Neuro: A&Ox3, speech clear, gait stable, cooperative, no focal neurologic deficits  Skin: Skin color is normal. No rashes or lesions. Non diaphoretic, moist.  Vascular: 2+ pulses symmetric in all extremities        EKG: Normal sinus rhythm     DIAGNOSTIC DATA     1. Stress Test  3/7/13- NM stress- no ischemia, EF 68%    2. Echo  2/25/13- EF 55%, MV mild annular calcification, TR mild, PAP 35 mmHg    3. LE Doppler  5/10/18-  No evidence of right lower extremity vein thrombosis.          LABORATORY DATA            Lab Results   Component Value Date/Time    WBC 9.8 09/17/2018 11:13 AM    HGB 14.5 09/17/2018 11:13 AM    HCT 41.7 09/17/2018 11:13 AM    PLATELET 226 86/97/9809 11:13 AM    MCV 89.5 09/17/2018 11:13 AM      Lab Results   Component Value Date/Time    Sodium 141 09/17/2018 11:13 AM    Potassium 3.6 09/17/2018 11:13 AM    Chloride 103 09/17/2018 11:13 AM    CO2 29 09/17/2018 11:13 AM    Anion gap 9 09/17/2018 11:13 AM    Glucose 99 09/17/2018 11:13 AM    BUN 14 09/17/2018 11:13 AM    Creatinine 1.04 (H) 09/17/2018 11:13 AM    BUN/Creatinine ratio 13 09/17/2018 11:13 AM    GFR est AA >60 09/17/2018 11:13 AM GFR est non-AA 53 (L) 09/17/2018 11:13 AM    Calcium 9.5 09/17/2018 11:13 AM    Bilirubin, total 0.3 09/17/2018 11:13 AM    AST (SGOT) 28 09/17/2018 11:13 AM    Alk. phosphatase 89 09/17/2018 11:13 AM    Protein, total 7.8 09/17/2018 11:13 AM    Albumin 3.5 09/17/2018 11:13 AM    Globulin 4.3 (H) 09/17/2018 11:13 AM    A-G Ratio 0.8 (L) 09/17/2018 11:13 AM    ALT (SGPT) 37 09/17/2018 11:13 AM           ASSESSMENT/RECOMMENDATIONS:.      1. HTN  - BP elevated. Recheck at stress test, if remains elevated, I favor increase dose of lisinopril and prinzide. encouraged on low sodium diet. 2. Dyslipidemia/elevated cholesterol  -Cholesterol and LDL is close to goal according to her PCP blood work. 3. Chest discomfort   - She has some chest discomfort that is mild, normal EKG and normal blood work back in ER. Will proceed with exercise cardiolite and echo cardiogram.    4. Obesity  - Encouraged her to work on healthy diet and exercising regularly. 5. Diabetes  - Pt has been taking diabetic medicine for two years and her PCP states that it's under control.   - Last HB A1C was 6.2  6. ISAIAH  - Pt uses CPAP, but has not been using it in the past two weeks as she was feeling congested. I review with her potential complication of not wearing the mask, such as weight gain and possible right heart failure. 7. Return in 6 weeks or PRN. Orders Placed This Encounter    cyclobenzaprine (FLEXERIL) 10 mg tablet     Sig: Take  by mouth three (3) times daily as needed for Muscle Spasm(s).  triamcinolone acetonide (KENALOG) 0.1 % topical cream     Sig: Apply  to affected area two (2) times a day. use thin layer    B.infantis-B.ani-B.long-B.bifi (PROBIOTIC 4X) 10-15 mg TbEC     Sig: Take  by mouth.  omega 3-dha-epa-fish oil 100-160-1,000 mg cap     Sig: Take  by mouth.         Follow-up Disposition: Not on File      I have discussed the diagnosis with  Aníbal Freeman and the intended plan as seen in the above orders. Questions were answered concerning future plans. I have discussed medication side effects and warnings with the patient as well. Thank you,  Sindi Waterman NP for involving me in the care of  Rosa Maria Zendejas. Please do not hesitate to contact me for further questions/concerns. Written by Natasha Mark, as dictated by Esme Palumbo MD.     Pako Maldonado MD, Durga Matson    Patient Care Team:  Sindi Waterman NP as PCP - [address], [address] [place] [address]      [phone] / [phone] Fax

## 2018-10-18 ENCOUNTER — CLINICAL SUPPORT (OUTPATIENT)
Dept: CARDIOLOGY CLINIC | Age: 68
End: 2018-10-18

## 2018-10-18 DIAGNOSIS — R07.9 CHEST PAIN, UNSPECIFIED TYPE: Primary | ICD-10-CM

## 2018-10-18 DIAGNOSIS — I10 ESSENTIAL HYPERTENSION: ICD-10-CM

## 2018-10-18 NOTE — PROGRESS NOTES
See scanned report. Dr. Antionette Rodriguez ordered study and Dr. Louie Escudero read study. ID verified per protocol. Explained procedure and risks to patient. All concerns and questions ansewered prior to obtaining consent test. Patient developed dyspnea during test. At 3 minutes in recovery, patient without symptoms or complaints voiced.

## 2018-10-18 NOTE — LETTER
10/25/2018 11:25 AM 
 
Ms. Nila Khan [place] [address] Dear Nila Khan: 
 
Please find your most recent results below. Resulted Orders STRESS TEST CARDIOLITE Narrative [facility], a Division of [address] [place] [address] and [facility]. [phone] [facility] [address] [place] [address], [address] [place], [address] [address] Exercise Myocardial Perfusion Study Date: 10/22/2018 Name: Nila Khan MRN: [id number] Ordering Physician: RASHIDA Holley 
PCP: Eros Cerrato NP Age: 79 y.o. Gender: female         YOB: 1950 Ht: 63 inches               Wt: 239 lbs Two Day Exercise Cardiolite Stress/Rest 
 
Indications:  
chest pain Risk Factors:  
hypertension, DM, hypercholesterolemia, and family history cad Date of Stress Images: 10/18/2018  Date of Resting Images: 10/22/2018 PROCEDURE: The patient performed treadmill exercise using a Noble protocol, completing 3 mins,45 secs and completing an estimated work load of 6.4 METS. The heart rate was 88 beats per min at base line and increased to 134 beats at peak exercise, which was 87% of the maximum predicted heart rate. The blood pressure at rest was 146/84 and changed to 210/100 at peak exercise. The blood pressure response was hypertensive. The patient did develop symptoms during the procedure. Reason for termination include dyspnea dyspnea. Symptoms did resolve within 3 minutes into recovery. No additional medications given. Myocardial perfusion imaging was performed at rest 60 mins following the intravenous injection,(Left antecubital) of 30.6 mCi of Tc99m Cardiolite. At peak exercise, the patient was injected intravenously,(Right antecubital) with 30.6 mCi of Tc99m Cardiolite and exercise was continued for 1 minute(s).  Gating post stress tomographic imaging performed 60 minutes after stress. Patient did not have a previous study done. EKG Rest EKG:  NSR, normal.   
Stress EKG:  No ischemic ST changes. Arrhythmias:  None. Findings: The overall quality of the study is excellent. Attenuation artifact - breast.  
Left ventricular cavity size is normal  on the stress and rest images. SPECT stress and rest images demonstrate a small sized, mild-to-moderate grade, partially reversible defect in mid anterior wall (sparing the apex). This defect resolves with prone imaging suggesting abnormality was due to breast attenuation. Gated SPECT images reveal normal myocardial thickening and wall motion. The left ventricular ejection fraction is calculated to be 75%. Impression: 1. Exercise capacity is average. 2.  Exercise treadmill test is normal at an optimal workload. 3.  Myocardial perfusion imaging is essentially normal.  No significant ischemia. 4.  Left ventricular systolic function is normal. LVEF 75% The Risk/Extent of Ischemia is low. Interpreting Physician:  
Lilliana Humphries MD  
10/23/2018 Electronically signed RECOMMENDATIONS: 
Hi Mr. Marli Ferris, Your exercise stress test revealed no abnormal blood flow to your heart muscle and this is great news. I hope all is well. Please call me if you have any questions: [phone] Sincerely, DMITRIY JOHN

## 2018-10-18 NOTE — Clinical Note
Paulina Damon, Please send this to the pateint with a copy of test . Also send a copy to the primary care. PLEASE CALL THEM WITH THE REPORT AS WELL AS SENDING THE LETTER. Thanks Andrey Gannon

## 2018-10-18 NOTE — LETTER
10/25/2018 11:26 AM 
 
Ms. Yara DumontOnley [address] [address] Dear Yara Melton: 
 
Please find your most recent results below. Resulted Orders STRESS TEST CARDIOLITE Narrative [facility], a Division of [address] [place] [address] and [facility]. [phone] [facility] [address] [facility], [address] [place], [address] [address] Exercise Myocardial Perfusion Study Date: 10/22/2018 Name: Yara Melton MRN: [id number] Ordering Physician: RASHIDA Wei 
PCP: Soco Goodman NP Age: 79 y.o. Gender: female         YOB: 1950 Ht: 63 inches               Wt: 239 lbs Two Day Exercise Cardiolite Stress/Rest 
 
Indications:  
chest pain Risk Factors:  
hypertension, DM, hypercholesterolemia, and family history cad Date of Stress Images: 10/18/2018  Date of Resting Images: 10/22/2018 PROCEDURE: The patient performed treadmill exercise using a Noble protocol, completing 3 mins,45 secs and completing an estimated work load of 6.4 METS. The heart rate was 88 beats per min at base line and increased to 134 beats at peak exercise, which was 87% of the maximum predicted heart rate. The blood pressure at rest was 146/84 and changed to 210/100 at peak exercise. The blood pressure response was hypertensive. The patient did develop symptoms during the procedure. Reason for termination include dyspnea dyspnea. Symptoms did resolve within 3 minutes into recovery. No additional medications given. Myocardial perfusion imaging was performed at rest 60 mins following the intravenous injection,(Left antecubital) of 30.6 mCi of Tc99m Cardiolite. At peak exercise, the patient was injected intravenously,(Right antecubital) with 30.6 mCi of Tc99m Cardiolite and exercise was continued for 1 minute(s).  Gating post stress tomographic imaging performed 60 minutes after stress. Patient did not have a previous study done. EKG Rest EKG:  NSR, normal.   
Stress EKG:  No ischemic ST changes. Arrhythmias:  None. Findings: The overall quality of the study is excellent. Attenuation artifact - breast.  
Left ventricular cavity size is normal  on the stress and rest images. SPECT stress and rest images demonstrate a small sized, mild-to-moderate grade, partially reversible defect in mid anterior wall (sparing the apex). This defect resolves with prone imaging suggesting abnormality was due to breast attenuation. Gated SPECT images reveal normal myocardial thickening and wall motion. The left ventricular ejection fraction is calculated to be 75%. Impression: 1. Exercise capacity is average. 2.  Exercise treadmill test is normal at an optimal workload. 3.  Myocardial perfusion imaging is essentially normal.  No significant ischemia. 4.  Left ventricular systolic function is normal. LVEF 75% The Risk/Extent of Ischemia is low. Interpreting Physician:  
Melvina Jha MD  
10/23/2018 Electronically signed RECOMMENDATIONS: 
Hi Ms. Marialuisa Barger, Your exercise stress test reveals no abnormal blood flow to your heart muscle and this is great news. Please call me if you have any questions: [phone] Sincerely, DMITRIY JOHN

## 2018-10-18 NOTE — Clinical Note
Delmar Tran, Please send this to the pateint with a copy of test . Also send a copy to the primary care. PLEASE CALL THEM WITH THE REPORT AS WELL AS SENDING THE LETTER. Thanks Guera Mena

## 2018-10-22 ENCOUNTER — CLINICAL SUPPORT (OUTPATIENT)
Dept: CARDIOLOGY CLINIC | Age: 68
End: 2018-10-22

## 2018-10-22 DIAGNOSIS — E11.9 CONTROLLED TYPE 2 DIABETES MELLITUS WITHOUT COMPLICATION, WITHOUT LONG-TERM CURRENT USE OF INSULIN (HCC): ICD-10-CM

## 2018-10-22 DIAGNOSIS — I10 ESSENTIAL HYPERTENSION: ICD-10-CM

## 2018-10-22 DIAGNOSIS — R07.9 CHEST PAIN, UNSPECIFIED TYPE: Primary | ICD-10-CM

## 2019-06-04 ENCOUNTER — APPOINTMENT (OUTPATIENT)
Dept: GENERAL RADIOLOGY | Age: 69
End: 2019-06-04
Attending: STUDENT IN AN ORGANIZED HEALTH CARE EDUCATION/TRAINING PROGRAM
Payer: MEDICARE

## 2019-06-04 ENCOUNTER — HOSPITAL ENCOUNTER (EMERGENCY)
Age: 69
Discharge: HOME | End: 2019-06-04
Attending: STUDENT IN AN ORGANIZED HEALTH CARE EDUCATION/TRAINING PROGRAM
Payer: MEDICARE

## 2019-06-04 VITALS
DIASTOLIC BLOOD PRESSURE: 85 MMHG | HEIGHT: 63 IN | OXYGEN SATURATION: 96 % | RESPIRATION RATE: 11 BRPM | SYSTOLIC BLOOD PRESSURE: 154 MMHG | WEIGHT: 240.74 LBS | HEART RATE: 70 BPM | TEMPERATURE: 98.5 F | BODY MASS INDEX: 42.66 KG/M2

## 2019-06-04 DIAGNOSIS — R07.9 CHEST PAIN, UNSPECIFIED TYPE: Primary | ICD-10-CM

## 2019-06-04 LAB
ALBUMIN SERPL-MCNC: 3.7 G/DL (ref 3.5–5)
ALBUMIN/GLOB SERPL: 1 {RATIO} (ref 1.1–2.2)
ALP SERPL-CCNC: 88 U/L (ref 45–117)
ALT SERPL-CCNC: 31 U/L (ref 12–78)
ANION GAP SERPL CALC-SCNC: 9 MMOL/L (ref 5–15)
AST SERPL-CCNC: 18 U/L (ref 15–37)
ATRIAL RATE: 79 BPM
BASOPHILS # BLD: 0 K/UL (ref 0–0.1)
BASOPHILS NFR BLD: 0 % (ref 0–1)
BILIRUB SERPL-MCNC: 0.4 MG/DL (ref 0.2–1)
BNP SERPL-MCNC: 20 PG/ML (ref 0–125)
BUN SERPL-MCNC: 17 MG/DL (ref 6–20)
BUN/CREAT SERPL: 17 (ref 12–20)
CALCIUM SERPL-MCNC: 9.7 MG/DL (ref 8.5–10.1)
CALCULATED P AXIS, ECG09: 46 DEGREES
CALCULATED R AXIS, ECG10: -6 DEGREES
CALCULATED T AXIS, ECG11: 59 DEGREES
CHLORIDE SERPL-SCNC: 104 MMOL/L (ref 97–108)
CO2 SERPL-SCNC: 27 MMOL/L (ref 21–32)
COMMENT, HOLDF: NORMAL
CREAT SERPL-MCNC: 0.98 MG/DL (ref 0.55–1.02)
DIAGNOSIS, 93000: NORMAL
DIFFERENTIAL METHOD BLD: NORMAL
EOSINOPHIL # BLD: 0.3 K/UL (ref 0–0.4)
EOSINOPHIL NFR BLD: 3 % (ref 0–7)
ERYTHROCYTE [DISTWIDTH] IN BLOOD BY AUTOMATED COUNT: 13.9 % (ref 11.5–14.5)
GLOBULIN SER CALC-MCNC: 3.8 G/DL (ref 2–4)
GLUCOSE SERPL-MCNC: 122 MG/DL (ref 65–100)
HCT VFR BLD AUTO: 41.1 % (ref 35–47)
HGB BLD-MCNC: 14.1 G/DL (ref 11.5–16)
LYMPHOCYTES # BLD: 3.1 K/UL (ref 0.8–3.5)
LYMPHOCYTES NFR BLD: 33 % (ref 12–49)
MCH RBC QN AUTO: 30.8 PG (ref 26–34)
MCHC RBC AUTO-ENTMCNC: 34.3 G/DL (ref 30–36.5)
MCV RBC AUTO: 89.7 FL (ref 80–99)
MONOCYTES # BLD: 0.7 K/UL (ref 0–1)
MONOCYTES NFR BLD: 7 % (ref 5–13)
NEUTS SEG # BLD: 5.3 K/UL (ref 1.8–8)
NEUTS SEG NFR BLD: 57 % (ref 32–75)
P-R INTERVAL, ECG05: 170 MS
PLATELET # BLD AUTO: 311 K/UL (ref 150–400)
PMV BLD AUTO: 10.7 FL (ref 8.9–12.9)
POTASSIUM SERPL-SCNC: 3.4 MMOL/L (ref 3.5–5.1)
PROT SERPL-MCNC: 7.5 G/DL (ref 6.4–8.2)
Q-T INTERVAL, ECG07: 384 MS
QRS DURATION, ECG06: 92 MS
QTC CALCULATION (BEZET), ECG08: 440 MS
RBC # BLD AUTO: 4.58 M/UL (ref 3.8–5.2)
SAMPLES BEING HELD,HOLD: NORMAL
SODIUM SERPL-SCNC: 140 MMOL/L (ref 136–145)
TROPONIN I SERPL-MCNC: <0.05 NG/ML
TROPONIN I SERPL-MCNC: <0.05 NG/ML
VENTRICULAR RATE, ECG03: 79 BPM
WBC # BLD AUTO: 9.4 K/UL (ref 3.6–11)

## 2019-06-04 PROCEDURE — 83880 ASSAY OF NATRIURETIC PEPTIDE: CPT

## 2019-06-04 PROCEDURE — 85025 COMPLETE CBC W/AUTO DIFF WBC: CPT

## 2019-06-04 PROCEDURE — 93005 ELECTROCARDIOGRAM TRACING: CPT

## 2019-06-04 PROCEDURE — 36415 COLL VENOUS BLD VENIPUNCTURE: CPT

## 2019-06-04 PROCEDURE — 71046 X-RAY EXAM CHEST 2 VIEWS: CPT

## 2019-06-04 PROCEDURE — 84484 ASSAY OF TROPONIN QUANT: CPT

## 2019-06-04 PROCEDURE — 74011250637 HC RX REV CODE- 250/637: Performed by: STUDENT IN AN ORGANIZED HEALTH CARE EDUCATION/TRAINING PROGRAM

## 2019-06-04 PROCEDURE — 80053 COMPREHEN METABOLIC PANEL: CPT

## 2019-06-04 PROCEDURE — 99285 EMERGENCY DEPT VISIT HI MDM: CPT

## 2019-06-04 RX ORDER — GUAIFENESIN 100 MG/5ML
324 LIQUID (ML) ORAL
Status: COMPLETED | OUTPATIENT
Start: 2019-06-04 | End: 2019-06-04

## 2019-06-04 RX ORDER — BISMUTH SUBSALICYLATE 262 MG
1 TABLET,CHEWABLE ORAL DAILY
COMMUNITY

## 2019-06-04 RX ORDER — GLUCOSAMINE/CHONDR SU A SOD 750-600 MG
TABLET ORAL
COMMUNITY

## 2019-06-04 RX ORDER — ASCORBIC ACID 250 MG
250 TABLET ORAL
COMMUNITY

## 2019-06-04 RX ORDER — AA/PROT/LYSINE/METHIO/VIT C/B6 50-12.5 MG
TABLET ORAL
COMMUNITY
End: 2020-05-19

## 2019-06-04 RX ADMIN — ASPIRIN 81 MG 324 MG: 81 TABLET ORAL at 12:35

## 2019-06-04 NOTE — ED NOTES
The patient was discharged home by provider in stable condition. The patient is alert and oriented, in no respiratory distress and discharge vital signs obtained. The patient's diagnosis, condition and treatment were explained. The patient expressed understanding. No prescriptions given. No work/school note given. A discharge plan has been developed. A  was not involved in the process. Aftercare instructions were given.   Pt ambulatory out of the ED

## 2019-06-04 NOTE — DISCHARGE INSTRUCTIONS

## 2019-06-04 NOTE — ED NOTES
Patient resting on stretcher in no distress. Patient updated regarding plan of care and associated time constraints. Patient verbalizes understanding and agreement to care plan. Call bell in reach. Will continue to monitor.

## 2019-06-04 NOTE — ED TRIAGE NOTES
Patient presents ambulatory to treatment area with a steady gait. Patient complains of intermittent mid chest pain that began about three to four days PTA. Patient states she has been resting, but symptoms have no gone away. Pain is more pronounced with activity. Denies shortness of breath. States she also has pressure in her jaw with chest pain. Denies diaphoresis or nausea.

## 2019-06-04 NOTE — ED PROVIDER NOTES
Patient is a 68-year-old female presenting to the emergency department her chest pain. Patient states that over the course of the last 3 days she has been experiencing substernal chest pain radiating into her jaw. She states that the chest pain is with exertion she states \"when I'm rushing\" when I asked her to clarify what that means she states when she is going up and down the steps and going from room to room in her house. She denies any dizziness, lightheadedness, diaphoresis, nausea, vomiting. She currently rates the pain as 3/10 has not taken anything for it. On chart review patient had a stress echo performed in 2018 that was negative for ischemia. Patient has cardiac risk factors including high blood pressure and diabetes.            Past Medical History:   Diagnosis Date    Acid indigestion     or heartburn    Arthritis     Asthma     Constipation     Diabetes ([address]     Frequent episodic tension-type headache     Heart disease     High blood pressure     Multiple stiff joints     Shortness of breath     Sinus pressure     Sore throat     Trouble in sleeping     Wheezing        Past Surgical History:   Procedure Laterality Date    BIOPSY BREAST      CARDIAC SURG PROCEDURE UNLIST      checked for blockages    ENDOSCOPY, COLON, DIAGNOSTIC  05/09         Family History:   Problem Relation Age of Onset    Breast Cancer Mother     Hypertension Mother     Diabetes Father     Coronary Artery Disease Sister     Breast Cancer Sister     Hypertension Brother     Heart Attack Maternal Grandmother     Dementia Paternal Grandmother     Hypertension Brother     Cancer Brother         Pancreas & Liver ca       Social History     Socioeconomic History    Marital status:      Spouse name: Not on file    Number of children: Not on file    Years of education: Not on file    Highest education level: Not on file   Occupational History    Not on file   Social Needs    Financial resource strain: Not on file    Food insecurity:     Worry: Not on file     Inability: Not on file    Transportation needs:     Medical: Not on file     Non-medical: Not on file   Tobacco Use    Smoking status: Never Smoker    Smokeless tobacco: Never Used   Substance and Sexual Activity    Alcohol use: No    Drug use: No    Sexual activity: Yes     Partners: Male   Lifestyle    Physical activity:     Days per week: Not on file     Minutes per session: Not on file    Stress: Not on file   Relationships    Social connections:     Talks on phone: Not on file     Gets together: Not on file     Attends [employer] service: Not on file     Active member of club or organization: Not on file     Attends meetings of clubs or organizations: Not on file     Relationship status: Not on file    Intimate partner violence:     Fear of current or ex partner: Not on file     Emotionally abused: Not on file     Physically abused: Not on file     Forced sexual activity: Not on file   Other Topics Concern    Not on file   Social History Narrative    Not on file         ALLERGIES: Pork/porcine containing products and Pcn [penicillins]    Review of Systems   Constitutional: Positive for activity change. Negative for appetite change, diaphoresis, fatigue and unexpected weight change. Respiratory: Positive for chest tightness. Cardiovascular: Positive for chest pain. Negative for palpitations and leg swelling. Gastrointestinal: Negative for nausea and vomiting. Neurological: Negative for dizziness and light-headedness. All other systems reviewed and are negative. There were no vitals filed for this visit. Physical Exam   Constitutional: She is oriented to person, place, and time. She appears well-developed and well-nourished. Morbidly obese   HENT:   Head: Normocephalic and atraumatic. Eyes: Pupils are equal, round, and reactive to light. EOM are normal.   Neck: Normal range of motion. Neck supple. Cardiovascular: Normal rate and regular rhythm. Exam reveals no gallop, no S3, no S4, no distant heart sounds and no friction rub. No murmur heard. Pulmonary/Chest: Effort normal and breath sounds normal.   Abdominal: Soft. Musculoskeletal: Normal range of motion. Neurological: She is alert and oriented to person, place, and time. Skin: Skin is warm and dry. Psychiatric: She has a normal mood and affect. Her behavior is normal.   Nursing note and vitals reviewed. MDM  Number of Diagnoses or Management Options  Chest pain, unspecified type:   Diagnosis management comments: A/P: ACS, stable angina, dyspnea on exertion. 61-year-old female presenting with chest pain associated with dyspnea on exertion concerning for stable angina. Patient with cardiac risk factors including hypertension and diabetes. Patient currently with a HEART score of 4. CBC, CMP, troponin, chest x-ray PA and lateral, BNP, aspirin 324 now, EKG. Amount and/or Complexity of Data Reviewed  Clinical lab tests: ordered and reviewed  Tests in the radiology section of CPT®: reviewed and ordered  Independent visualization of images, tracings, or specimens: yes    Risk of Complications, Morbidity, and/or Mortality  Presenting problems: moderate  Diagnostic procedures: moderate  Management options: moderate    Patient Progress  Patient progress: stable      ED EKG interpretation:  Rhythm: normal sinus rhythm; and regular . Rate (approx.): 79; Axis: normal; P wave: normal; QRS interval: normal ; ST/T wave: normal; in  Lead: II; Other findings: unchanged from previous ekg. EKG documented by German Valdez MD, s     Procedures    1:54 PM  Consult to cardiology spoke to Dr. David Ramírze who requests a second troponin at 2:30 pm and if negative they will see her tomorrow in their office.

## 2019-06-05 ENCOUNTER — OFFICE VISIT (OUTPATIENT)
Dept: CARDIOLOGY CLINIC | Age: 69
End: 2019-06-05

## 2019-06-05 ENCOUNTER — HOSPITAL ENCOUNTER (OUTPATIENT)
Dept: LAB | Age: 69
Discharge: HOME | End: 2019-06-05
Payer: MEDICARE

## 2019-06-05 VITALS
WEIGHT: 239.2 LBS | DIASTOLIC BLOOD PRESSURE: 80 MMHG | HEART RATE: 73 BPM | OXYGEN SATURATION: 97 % | HEIGHT: 63 IN | SYSTOLIC BLOOD PRESSURE: 122 MMHG | BODY MASS INDEX: 42.38 KG/M2

## 2019-06-05 DIAGNOSIS — E78.00 ELEVATED CHOLESTEROL: ICD-10-CM

## 2019-06-05 DIAGNOSIS — I20.9 ANGINA PECTORIS (HCC): Primary | ICD-10-CM

## 2019-06-05 DIAGNOSIS — I10 ESSENTIAL HYPERTENSION: ICD-10-CM

## 2019-06-05 DIAGNOSIS — I20.9 ANGINA PECTORIS (HCC): ICD-10-CM

## 2019-06-05 DIAGNOSIS — I10 ESSENTIAL HYPERTENSION: Primary | ICD-10-CM

## 2019-06-05 DIAGNOSIS — R07.89 CHEST DISCOMFORT: ICD-10-CM

## 2019-06-05 PROCEDURE — 80076 HEPATIC FUNCTION PANEL: CPT

## 2019-06-05 PROCEDURE — 36415 COLL VENOUS BLD VENIPUNCTURE: CPT

## 2019-06-05 PROCEDURE — 80061 LIPID PANEL: CPT

## 2019-06-05 RX ORDER — NITROGLYCERIN 0.4 MG/1
0.4 TABLET SUBLINGUAL
Qty: 1 BOTTLE | Refills: 5 | Status: SHIPPED | OUTPATIENT
Start: 2019-06-05 | End: 2019-06-05 | Stop reason: SDUPTHER

## 2019-06-05 RX ORDER — NITROGLYCERIN 0.4 MG/1
0.4 TABLET SUBLINGUAL
Qty: 1 BOTTLE | Refills: 5 | Status: SHIPPED | OUTPATIENT
Start: 2019-06-05

## 2019-06-05 NOTE — PROGRESS NOTES
LAST OFFICE VISIT : Visit date not found        ICD-10-CM ICD-9-CM   1. Essential hypertension I10 401.9   2. Angina pectoris (HCC) I20.9 413.9   3. Elevated cholesterol E78.00 272.0            Lubna Allred is a 79 y.o. female with HTN, angina, asthma, elevated cholesterol and obesity referred for follow up from ED. Cardiac risk factors: elevated cholesterol, obesity, hypertension  I have personally obtained the history from the patient. HISTORY OF PRESENTING ILLNESS     She is under stress at [employer]. She has had some burning in chest. It is in the center of her chest. Sometimes feels like a sticking pain. No pain with resirations. Rushing to do things will note the discomfort. She will have some SOB. No nocturnal pain and no postprandial pain. Duration of pain is one minute. Last episode was today when fussing with grandson. She had no chest pain with walking unless walking fast.she has not been active. She was seen in the ER yesterday with chest pain. She ruled out for MI. Trop were negative. She states she has been in bed a lot with URI. The patient denies shortness of breath, orthopnea, PND, LE edema, palpitations, syncope, presyncope or fatigue.          ACTIVE PROBLEM LIST     Patient Active Problem List    Diagnosis Date Noted    HTN (hypertension) 07/06/2011    Angina pectoris ([facility] [address] 07/06/2011    Asthma 07/06/2011    Obesity 07/06/2011    Elevated cholesterol 07/06/2011    HSV-2 (herpes simplex virus 2) infection 07/06/2011    Osteopenia 07/06/2011           PAST MEDICAL HISTORY     Past Medical History:   Diagnosis Date    Acid indigestion     or heartburn    Arthritis     Asthma     Constipation     Diabetes ([address]     Frequent episodic tension-type headache     Heart disease     High blood pressure     Multiple stiff joints     Shortness of breath     Sinus pressure     Sore throat     Trouble in sleeping     Wheezing            PAST SURGICAL HISTORY     Past Surgical History:   Procedure Laterality Date    BIOPSY BREAST      CARDIAC SURG PROCEDURE UNLIST      checked for blockages    ENDOSCOPY, COLON, DIAGNOSTIC  05/09          ALLERGIES     Allergies   Allergen Reactions    Pork/Porcine Containing Products Angioedema    Pcn [Penicillins] Rash          FAMILY HISTORY     Family History   Problem Relation Age of Onset   [place]ajal Breast Cancer Mother     Hypertension Mother     Diabetes Father     Coronary Artery Disease Sister     Breast Cancer Sister     Hypertension Brother     Heart Attack Maternal Grandmother     Dementia Paternal Grandmother     Hypertension Brother     Cancer Brother         Pancreas & Liver ca    negative for cardiac disease       SOCIAL HISTORY     Social History     Socioeconomic History    Marital status:      Spouse name: Not on file    Number of children: Not on file    Years of education: Not on file    Highest education level: Not on file   Tobacco Use    Smoking status: Never Smoker    Smokeless tobacco: Never Used   Substance and Sexual Activity    Alcohol use: No    Drug use: No    Sexual activity: Yes     Partners: Male         MEDICATIONS     Current Outpatient Medications   Medication Sig    multivitamin (ONE A DAY) tablet Take 1 Tab by mouth daily.  ascorbic acid, vitamin C, (VITAMIN C) 250 mg tablet Take 250 mg by mouth.  coenzyme q10 (CO Q-10) 10 mg cap Take  by mouth.  Biotin 2,500 mcg cap Take  by mouth.  cyclobenzaprine (FLEXERIL) 10 mg tablet Take  by mouth three (3) times daily as needed for Muscle Spasm(s).  triamcinolone acetonide (KENALOG) 0.1 % topical cream Apply  to affected area two (2) times a day. use thin layer    B.infantis-B.ani-B.long-B.bifi (PROBIOTIC 4X) 10-15 mg TbEC Take  by mouth.  omega 3-dha-epa-fish oil 100-160-1,000 mg cap Take  by mouth.  carvedilol (COREG) 25 mg tablet Take 25 mg by mouth two (2) times daily (with meals).     omeprazole (PRILOSEC) 20 mg capsule Take 20 mg by mouth daily.  simvastatin (ZOCOR) 10 mg tablet Take 20 mg by mouth nightly.  metFORMIN (GLUCOPHAGE) 500 mg tablet Take 500 mg by mouth daily (with breakfast).  potassium chloride SR (KLOR-CON 10) 10 mEq tablet Take 10 mEq by mouth two (2) times a day.  nortriptyline (PAMELOR) 25 mg capsule Take 1 Cap by mouth nightly.  diltiazem CD (CARDIZEM CD) 240 mg ER capsule Take 300 mg by mouth daily.  lisinopril-hydrochlorothiazide (PRINZIDE, ZESTORETIC) 20-12.5 mg per tablet Take 2 Tabs by mouth daily.  Cholecalciferol, Vitamin D3, (VITAMIN D) 1,000 unit Cap Take 2,000 Units by mouth.  aspirin 81 mg tablet Take 81 mg by mouth. No current facility-administered medications for this visit. I have reviewed the nurses notes, vitals, problem list, allergy list, medical history, family, social history and medications. REVIEW OF SYMPTOMS      General: Pt denies excessive weight gain or loss. Pt is able to conduct ADL's  HEENT: Denies blurred vision, headaches, hearing loss, epistaxis and difficulty swallowing. Respiratory: Denies cough, congestion, shortness of breath, BERMUDEZ, wheezing or stridor. Cardiovascular: Denies precordial pain, palpitations, edema or PND  Gastrointestinal: Denies poor appetite, indigestion, abdominal pain or blood in stool  Genitourinary: Denies hematuria, dysuria, increased urinary frequency  Musculoskeletal: Denies joint pain or swelling from muscles or joints  Neurologic: Denies tremor, paresthesias, headache, or sensory motor disturbance  Psychiatric: Denies confusion, insomnia, depression  Integumentray: Denies rash, itching or ulcers. Hematologic: Denies easy bruising, bleeding     PHYSICAL EXAMINATION      Vitals:    06/05/19 1304   BP: 122/80   Pulse: 73   SpO2: 97%   Weight: 239 lb 3.2 oz (108.5 kg)   Height: 5' 3\" (1.6 m)   BP recheck:   General: Well developed, in no acute distress. overweight  HEENT: No jaundice, oral mucosa moist, no oral ulcers  Neck: Supple, no stiffness, no lymphadenopathy, supple  Heart:  Normal S1/S2 negative S3 or S4. Regular, no murmur, gallop or rub, no jugular venous distention  Respiratory: Clear bilaterally x 4, no wheezing or rales  Abdomen:   Soft, non-tender, bowel sounds are active.   Extremities:  No edema, normal cap refill, no cyanosis. Musculoskeletal: No clubbing, no deformities  Neuro: A&Ox3, speech clear, gait stable, cooperative, no focal neurologic deficits  Skin: Skin color is normal. No rashes or lesions. Non diaphoretic, moist.  Vascular: 2+ pulses symmetric in all extremities        EKG: Normal sinus rhythm     DIAGNOSTIC DATA     1. Stress Test  3/7/13- NM stress- no ischemia, EF 68%  10/22/18-Cardiolite-no ischemia, EF 75%    2. Echo  2/25/13- EF 55%, MV mild annular calcification, TR mild, PAP 35 mmHg    3. LE Doppler  5/10/18- No evidence of right lower extremity vein thrombosis. 4. Cholesterol   9/5/18: , HDL 45, LDL 83, . LABORATORY DATA            Lab Results   Component Value Date/Time    WBC 9.4 06/04/2019 12:25 PM    HGB 14.1 06/04/2019 12:25 PM    HCT 41.1 06/04/2019 12:25 PM    PLATELET 323 49/54/1483 12:25 PM    MCV 89.7 06/04/2019 12:25 PM      Lab Results   Component Value Date/Time    Sodium 140 06/04/2019 12:25 PM    Potassium 3.4 (L) 06/04/2019 12:25 PM    Chloride 104 06/04/2019 12:25 PM    CO2 27 06/04/2019 12:25 PM    Anion gap 9 06/04/2019 12:25 PM    Glucose 122 (H) 06/04/2019 12:25 PM    BUN 17 06/04/2019 12:25 PM    Creatinine 0.98 06/04/2019 12:25 PM    BUN/Creatinine ratio 17 06/04/2019 12:25 PM    GFR est AA >60 06/04/2019 12:25 PM    GFR est non-AA 57 (L) 06/04/2019 12:25 PM    Calcium 9.7 06/04/2019 12:25 PM    Bilirubin, total 0.4 06/04/2019 12:25 PM    AST (SGOT) 18 06/04/2019 12:25 PM    Alk.  phosphatase 88 06/04/2019 12:25 PM    Protein, total 7.5 06/04/2019 12:25 PM    Albumin 3.7 06/04/2019 12:25 PM    Globulin 3.8 06/04/2019 12:25 PM    A-G Ratio 1.0 (L) 06/04/2019 12:25 PM    ALT (SGPT) 31 06/04/2019 12:25 PM           ASSESSMENT/RECOMMENDATIONS:.      1. HTN  - BP   Good today and no a Mr. Cooley Marker 6-month follow-up complaints no adjustment on meds. 2. Dyslipidemia/elevated cholesterol  -CI will recheck her cholesterol       3. Chest discomfort   - recurrence of chest pain. I believe she is at risk for coronary artery disease and that she had a stress test with the last year. She is set up with her obesity.    -I am recommending at this time a CT coronary angiogram and if we cannot get this will also schedule a Lexiscan Cardiolite in case this is not approved by her insurance company.    -I will  also start her on Nitrostat 0.4 mg and instructed her to use this every 5 minutes only x3 times otherwise call the EMS. Should her symptoms worsen she needs to go to the emergency room immediately. And that we would do a heart catheterization. I gave her information about a weight loss doctor as well as major weight loss    4. Obesity  - Encouraged her to work on healthy diet and exercising regularly. -Gave her information on a weight loss physician as well as medi weight loss    5. Diabetes  - Pt has been taking diabetic medicine for two years and her PCP states that it's under control.   - Unclear what last HgbA1c was . 6. ISAIAH  - Pt uses CPAP regularly     7. Return in 6 weeks or PRN. No orders of the defined types were placed in this encounter. Follow-up and Dispositions  ·   Return in about 6 months (around 12/5/2019). I have discussed the diagnosis with  Tomeka Murphy and the intended plan as seen in the above orders. Questions were answered concerning future plans. I have discussed medication side effects and warnings with the patient as well. Thank you,  Jf Coello MD for involving me in the care of  Tomeka Murphy. Please do not hesitate to contact me for further questions/concerns.      Written by Shayna Hartmann, as dictated by Ranulfo Valverde MD.     Sanket Lindquist. Isacc Winkler MD, [facility] [place]    Patient Care Team:  Derick Wu MD as PCP - General (Family Practice)    [address]      [phone] / [phone] Fax

## 2019-06-05 NOTE — PROGRESS NOTES
Patient was in the ER 6/4/19    LIPIDS 9/18  Patient has been having a burning pain in chest all morning   Patient says she gets lightheaded sometimes       Visit Vitals  /80 (BP 1 Location: Left arm, BP Patient Position: Sitting)   Pulse 73   Ht 5' 3\" (1.6 m)   Wt 239 lb 3.2 oz (108.5 kg)   SpO2 97%   BMI 42.37 kg/m²

## 2019-06-05 NOTE — PATIENT INSTRUCTIONS
1) Weight loss doctor is Rolando Campbell MD on the [address] [facility] [address] end    2) or consider medi weight loss.

## 2019-06-06 LAB
ALBUMIN SERPL-MCNC: 4.5 G/DL (ref 3.6–4.8)
ALP SERPL-CCNC: 80 IU/L (ref 39–117)
ALT SERPL-CCNC: 22 IU/L (ref 0–32)
AST SERPL-CCNC: 13 IU/L (ref 0–40)
BILIRUB DIRECT SERPL-MCNC: 0.17 MG/DL (ref 0–0.4)
BILIRUB SERPL-MCNC: 0.4 MG/DL (ref 0–1.2)
CHOLEST SERPL-MCNC: 143 MG/DL (ref 100–199)
HDLC SERPL-MCNC: 42 MG/DL
INTERPRETATION, 910389: NORMAL
LDLC SERPL CALC-MCNC: 79 MG/DL (ref 0–99)
PROT SERPL-MCNC: 7.1 G/DL (ref 6–8.5)
TRIGL SERPL-MCNC: 111 MG/DL (ref 0–149)
VLDLC SERPL CALC-MCNC: 22 MG/DL (ref 5–40)

## 2019-06-14 ENCOUNTER — TELEPHONE (OUTPATIENT)
Dept: CARDIOLOGY CLINIC | Age: 69
End: 2019-06-14

## 2019-06-14 NOTE — TELEPHONE ENCOUNTER
Jatin Ford from the scheduling department at [facility] is calling to inform that patient is scheduled for June 6/24/19 @ 1pm at [address] [place] [address] for the coronary artery scan. She states the order had instructions to let the office know so that the atenolol can be ordered for patient.      Phone: [phone] Vannesa Medina)

## 2019-06-21 ENCOUNTER — TELEPHONE (OUTPATIENT)
Dept: CARDIOLOGY CLINIC | Age: 69
End: 2019-06-21

## 2019-06-21 RX ORDER — ATENOLOL 25 MG/1
TABLET ORAL
COMMUNITY
End: 2019-06-21 | Stop reason: SDUPTHER

## 2019-06-21 RX ORDER — ATENOLOL 25 MG/1
25 TABLET ORAL AS NEEDED
Qty: 2 TAB | Refills: 0 | Status: SHIPPED | OUTPATIENT
Start: 2019-06-21 | End: 2019-11-29 | Stop reason: ALTCHOICE

## 2019-06-21 NOTE — TELEPHONE ENCOUNTER
Pt called asking when her prescription would be ready. She stated she does not know the name of the medication. She is home currently but stated her daughter would pick the medication up for her today. Pharmacy confirmed.   Phone #[phone]  Thanks

## 2019-06-21 NOTE — TELEPHONE ENCOUNTER
8/21/2017         RE: Mayito Greenberg  [address] [place] [address]        Dear Colleague,    Thank you for referring your patient, Mayito Greenberg, to the [facility]. Please see a copy of my visit note below.    Weight management plan: Patient was referred to their PCP to discuss a diet and exercise plan.     LEE ANN Sunshine MA August 21, 2017 2:29 PM      PATIENT HISTORY:  Mayito Greenberg is a 40 year old male who presents to clinic for recheck of a L 3rd metatarsal stress fx.  Doing well.  WBAT in post op shoe.  2/10 pain.  Worse with walking at times, but overall improving.  Denies fevers, new injury.  Nonsmoker.  Nondiabetic.       EXAM:Vitals: /82 (BP Location: Left arm, Patient Position: Chair, Cuff Size: Adult Regular)  Ht 6' (1.829 m)  Wt 201 lb (91.2 kg)  BMI 27.26 kg/m2  BMI= Body mass index is 27.26 kg/(m^2).    General appearance: Patient is alert and fully cooperative with history & exam.  No sign of distress is noted during the visit.     Dermatologic: Skin is intact to both L foot without significant lesions, rash or abrasion.  No paronychia or evidence of soft tissue infection is noted.      Vascular: DP & PT pulses are intact & regular  on the left.  Left dorsal forefoot edema improving.  CFT and skin temperature are normal.      Neurologic: Lower extremity sensation is intact to light touch.  No evidence of weakness or contracture in the lower extremities.  No evidence of neuropathy.      Musculoskeletal: Mild L foot pain with palpation of the 3rd metatarsal neck area.  Patient is ambulatory without assistive device or brace.  No gross ankle deformity noted.  No foot or ankle joint effusion is noted.     XRs of left foot reviewed with pt.   Increased callus formation at the neck of the 3rd metatarsal.      ASSESSMENT: Left foot 3rd metatarsal stress fracture      PLAN:  Reviewed patient's chart in epic.  Discussed condition and treatment options including pros  Informed pt and faxed rx and cons.     Continue current care plan.  PRICE advised.  Use post op shoe.  WBAT  F/u in 4 wks.      Shaan Flores DPM, FACFAS          Again, thank you for allowing me to participate in the care of your patient.        Sincerely,        Shaan Flores DPM

## 2019-06-24 ENCOUNTER — HOSPITAL ENCOUNTER (OUTPATIENT)
Dept: CT IMAGING | Age: 69
Discharge: HOME | End: 2019-06-24
Attending: SPECIALIST
Payer: MEDICARE

## 2019-06-24 VITALS — DIASTOLIC BLOOD PRESSURE: 83 MMHG | SYSTOLIC BLOOD PRESSURE: 185 MMHG | HEART RATE: 67 BPM

## 2019-06-24 DIAGNOSIS — I20.9 ANGINA PECTORIS (HCC): ICD-10-CM

## 2019-06-24 DIAGNOSIS — R07.89 CHEST DISCOMFORT: ICD-10-CM

## 2019-06-24 DIAGNOSIS — I10 ESSENTIAL HYPERTENSION: ICD-10-CM

## 2019-06-24 PROCEDURE — 74011636320 HC RX REV CODE- 636/320: Performed by: RADIOLOGY

## 2019-06-24 PROCEDURE — 74011000250 HC RX REV CODE- 250: Performed by: INTERNAL MEDICINE

## 2019-06-24 PROCEDURE — 75574 CT ANGIO HRT W/3D IMAGE: CPT

## 2019-06-24 RX ORDER — IODIXANOL 320 MG/ML
200 INJECTION, SOLUTION INTRAVASCULAR
Status: COMPLETED | OUTPATIENT
Start: 2019-06-24 | End: 2019-06-24

## 2019-06-24 RX ORDER — METOPROLOL TARTRATE 5 MG/5ML
5 INJECTION INTRAVENOUS
Status: DISCONTINUED | OUTPATIENT
Start: 2019-06-24 | End: 2019-06-24 | Stop reason: ALTCHOICE

## 2019-06-24 RX ADMIN — METOPROLOL TARTRATE 5 MG: 1 INJECTION, SOLUTION INTRAVENOUS at 14:16

## 2019-06-24 RX ADMIN — METOPROLOL TARTRATE 5 MG: 1 INJECTION, SOLUTION INTRAVENOUS at 14:31

## 2019-06-24 RX ADMIN — METOPROLOL TARTRATE 5 MG: 1 INJECTION, SOLUTION INTRAVENOUS at 14:21

## 2019-06-24 RX ADMIN — METOPROLOL TARTRATE 5 MG: 1 INJECTION, SOLUTION INTRAVENOUS at 14:26

## 2019-06-24 RX ADMIN — IODIXANOL 100 ML: 320 INJECTION, SOLUTION INTRAVASCULAR at 14:42

## 2019-06-24 NOTE — PROGRESS NOTES
Per Dr Brittaney Mcghee give pt lopressor 5 mg q5 min up to 20 mg until HR <11 or ssytolic BP <007.  20 mg given in 4 five mg doses and no decrease in HR or BP. Vitals are noted in flowsheets.

## 2019-06-28 ENCOUNTER — TELEPHONE (OUTPATIENT)
Dept: CARDIOLOGY CLINIC | Age: 69
End: 2019-06-28

## 2019-06-28 NOTE — TELEPHONE ENCOUNTER
Patient states that she was sent for a CT scan per Dr. Seema Decker. Patient would like to know if she still needs to have the 2 day nuclear test scheduled for 7/8 and 7/10 or if that can be cancelled.      Phone: [phone] or [phone]

## 2019-08-06 ENCOUNTER — OFFICE VISIT (OUTPATIENT)
Dept: CARDIOLOGY CLINIC | Age: 69
End: 2019-08-06

## 2019-08-06 VITALS
SYSTOLIC BLOOD PRESSURE: 140 MMHG | BODY MASS INDEX: 42.7 KG/M2 | WEIGHT: 241 LBS | HEIGHT: 63 IN | DIASTOLIC BLOOD PRESSURE: 80 MMHG | HEART RATE: 72 BPM

## 2019-08-06 DIAGNOSIS — E66.01 OBESITY, MORBID (HCC): ICD-10-CM

## 2019-08-06 DIAGNOSIS — I10 ESSENTIAL HYPERTENSION: Primary | ICD-10-CM

## 2019-08-06 DIAGNOSIS — E78.5 DYSLIPIDEMIA: ICD-10-CM

## 2019-08-06 NOTE — PROGRESS NOTES
LAST OFFICE VISIT : Visit date not found        ICD-10-CM ICD-9-CM   1. Essential hypertension I10 401.9   2. Obesity, morbid ([address] E66.01 278.01   3. Dyslipidemia E78.5 272.4            Abe De is a 81 y.o. female with HTN, angina, asthma, dyslipidemia, and obesity referred for follow up. Cardiac risk factors: dyslipidemia, obesity, hypertension, post-menopausal  I have personally obtained the history from the patient. HISTORY OF PRESENTING ILLNESS     Overall the pt states she is doing well. Pt states she is not feeling chest discomfort. Pt states she walks \"a little\". Pt wears CPAP every night. Pt states that she still drinks sodas and eats bread regularly. The patient denies chest pain/ shortness of breath, orthopnea, PND, LE edema, palpitations, syncope, presyncope or fatigue.          ACTIVE PROBLEM LIST     Patient Active Problem List    Diagnosis Date Noted    Obesity, morbid ([facility] [address] 08/06/2019    HTN (hypertension) 07/06/2011    Angina pectoris ([address] 07/06/2011    Asthma 07/06/2011    Obesity 07/06/2011    Elevated cholesterol 07/06/2011    HSV-2 (herpes simplex virus 2) infection 07/06/2011    Osteopenia 07/06/2011           PAST MEDICAL HISTORY     Past Medical History:   Diagnosis Date    Acid indigestion     or heartburn    Arthritis     Asthma     Constipation     Diabetes ([address]     Frequent episodic tension-type headache     Heart disease     High blood pressure     Multiple stiff joints     Shortness of breath     Sinus pressure     Sore throat     Trouble in sleeping     Wheezing            PAST SURGICAL HISTORY     Past Surgical History:   Procedure Laterality Date    BIOPSY BREAST      CARDIAC SURG PROCEDURE UNLIST      checked for blockages    ENDOSCOPY, COLON, DIAGNOSTIC  05/09          ALLERGIES     Allergies   Allergen Reactions    Pork/Porcine Containing Products Angioedema    Pcn [Penicillins] Rash          FAMILY HISTORY     Family History Problem Relation Age of Onset    Breast Cancer Mother     Hypertension Mother     Diabetes Father     Coronary Artery Disease Sister     Breast Cancer Sister     Hypertension Brother     Heart Attack Maternal Grandmother     Dementia Paternal Grandmother     Hypertension Brother     Cancer Brother         Pancreas & Liver ca    negative for cardiac disease       SOCIAL HISTORY     Social History     Socioeconomic History    Marital status:      Spouse name: Not on file    Number of children: Not on file    Years of education: Not on file    Highest education level: Not on file   Tobacco Use    Smoking status: Never Smoker    Smokeless tobacco: Never Used   Substance and Sexual Activity    Alcohol use: No    Drug use: No    Sexual activity: Yes     Partners: Male         MEDICATIONS     Current Outpatient Medications   Medication Sig    atenolol (TENORMIN) 25 mg tablet Take 1 Tab by mouth as needed (for test). 25 mg night prior to CT and 25 mg am of CT    nitroglycerin (NITROSTAT) 0.4 mg SL tablet 1 Tab by SubLINGual route every five (5) minutes as needed for Chest Pain.  multivitamin (ONE A DAY) tablet Take 1 Tab by mouth daily.  ascorbic acid, vitamin C, (VITAMIN C) 250 mg tablet Take 250 mg by mouth.  coenzyme q10 (CO Q-10) 10 mg cap Take  by mouth.  Biotin 2,500 mcg cap Take  by mouth.  cyclobenzaprine (FLEXERIL) 10 mg tablet Take  by mouth three (3) times daily as needed for Muscle Spasm(s).  triamcinolone acetonide (KENALOG) 0.1 % topical cream Apply  to affected area two (2) times a day. use thin layer    B.infantis-B.ani-B.long-B.bifi (PROBIOTIC 4X) 10-15 mg TbEC Take  by mouth.  omega 3-dha-epa-fish oil 100-160-1,000 mg cap Take  by mouth.  carvedilol (COREG) 25 mg tablet Take 25 mg by mouth two (2) times daily (with meals).  omeprazole (PRILOSEC) 20 mg capsule Take 20 mg by mouth daily.     simvastatin (ZOCOR) 10 mg tablet Take 20 mg by mouth nightly.  metFORMIN (GLUCOPHAGE) 500 mg tablet Take 500 mg by mouth daily (with breakfast).  potassium chloride SR (KLOR-CON 10) 10 mEq tablet Take 10 mEq by mouth two (2) times a day.  nortriptyline (PAMELOR) 25 mg capsule Take 1 Cap by mouth nightly.  diltiazem CD (CARDIZEM CD) 240 mg ER capsule Take 300 mg by mouth daily.  lisinopril-hydrochlorothiazide (PRINZIDE, ZESTORETIC) 20-12.5 mg per tablet Take 2 Tabs by mouth daily.  Cholecalciferol, Vitamin D3, (VITAMIN D) 1,000 unit Cap Take 2,000 Units by mouth.  aspirin 81 mg tablet Take 81 mg by mouth. No current facility-administered medications for this visit. I have reviewed the nurses notes, vitals, problem list, allergy list, medical history, family, social history and medications. REVIEW OF SYMPTOMS      General: Pt denies excessive weight gain or loss. Pt is able to conduct ADL's  HEENT: Denies blurred vision, headaches, hearing loss, epistaxis and difficulty swallowing. Respiratory: Denies cough, congestion, shortness of breath, BERMUDEZ, wheezing or stridor. Cardiovascular: Denies precordial pain, palpitations, edema or PND  Gastrointestinal: Denies poor appetite, indigestion, abdominal pain or blood in stool  Genitourinary: Denies hematuria, dysuria, increased urinary frequency  Musculoskeletal: Denies joint pain or swelling from muscles or joints  Neurologic: Denies tremor, paresthesias, headache, or sensory motor disturbance  Psychiatric: Denies confusion, insomnia, depression  Integumentray: Denies rash, itching or ulcers. Hematologic: Denies easy bruising, bleeding     PHYSICAL EXAMINATION      Vitals:    08/06/19 1311   BP: 140/80   Pulse: 72   Weight: 241 lb (109.3 kg)   Height: 5' 3\" (1.6 m)     General: Well developed, in no acute distress. HEENT: No jaundice, oral mucosa moist, no oral ulcers  Neck: Supple, no stiffness, no lymphadenopathy, supple  Heart:  Normal S1/S2 negative S3 or S4.  Regular, no murmur, gallop or rub, no jugular venous distention  Respiratory: Clear bilaterally x 4, no wheezing or rales  Abdomen:   Soft, non-tender, bowel sounds are active. Extremities:  No edema, normal cap refill, no cyanosis. Musculoskeletal: No clubbing, no deformities  Neuro: A&Ox3, speech clear, gait stable, cooperative, no focal neurologic deficits  Skin: Skin color is normal. No rashes or lesions. Non diaphoretic, moist.  Vascular: 2+ pulses symmetric in all extremities           DIAGNOSTIC DATA     1. Stress Test  3/7/13- NM stress- no ischemia, EF 68%  10/22/18-Cardiolite-no ischemia, EF 75%    2. Echo  2/25/13- EF 55%, MV mild annular calcification, TR mild, PAP 35 mmHg    3. LE Doppler  5/10/18- No evidence of right lower extremity vein thrombosis. 4. Cholesterol   9/5/18: , HDL 45, LDL 83, .  6/5/19- , HDL 42, LDL 79,          LABORATORY DATA            Lab Results   Component Value Date/Time    WBC 9.4 06/04/2019 12:25 PM    HGB 14.1 06/04/2019 12:25 PM    HCT 41.1 06/04/2019 12:25 PM    PLATELET 195 39/56/5688 12:25 PM    MCV 89.7 06/04/2019 12:25 PM      Lab Results   Component Value Date/Time    Sodium 140 06/04/2019 12:25 PM    Potassium 3.4 (L) 06/04/2019 12:25 PM    Chloride 104 06/04/2019 12:25 PM    CO2 27 06/04/2019 12:25 PM    Anion gap 9 06/04/2019 12:25 PM    Glucose 122 (H) 06/04/2019 12:25 PM    BUN 17 06/04/2019 12:25 PM    Creatinine 0.98 06/04/2019 12:25 PM    BUN/Creatinine ratio 17 06/04/2019 12:25 PM    GFR est AA >60 06/04/2019 12:25 PM    GFR est non-AA 57 (L) 06/04/2019 12:25 PM    Calcium 9.7 06/04/2019 12:25 PM    Bilirubin, total 0.4 06/05/2019 02:09 PM    AST (SGOT) 13 06/05/2019 02:09 PM    Alk.  phosphatase 80 06/05/2019 02:09 PM    Protein, total 7.1 06/05/2019 02:09 PM    Albumin 4.5 06/05/2019 02:09 PM    Globulin 3.8 06/04/2019 12:25 PM    A-G Ratio 1.0 (L) 06/04/2019 12:25 PM    ALT (SGPT) 22 06/05/2019 02:09 PM           ASSESSMENT/RECOMMENDATIONS:. 1. HTN  - Blood pressure is borderline elevated. Reduce sodium intake. 2. Dyslipidemia  - Lipids are at goal on current medical regimen. Will check again in 6 months. 3. Chest discomfort  - No significant chest discomfort. Cardiac testing was negative within past year. Calcium scoring is zero. She is a set up for CAD with her underlying obesity. 4. Obesity  - Encouraged her to work on healthy diet and exercising regularly. - Gave her information on a weight loss physician as well as medi weight loss. 5. Diabetes  - Last HGB A1C was 6.2 in 9/5/18. Goal should be 6 or below. 6. ISAIAH  - Is complaint with CPAP  7. Return in 6 months or PRN. Orders Placed This Encounter    HEPATIC FUNCTION PANEL     Standing Status:   Future     Standing Expiration Date:   8/6/2020    LIPID PANEL          Follow-up and Dispositions  ·   Return in about 6 months (around 2/6/2020). I have discussed the diagnosis with  Xin Curiel and the intended plan as seen in the above orders. Questions were answered concerning future plans. I have discussed medication side effects and warnings with the patient as well. Thank you,  Endy Robison MD for involving me in the care of  Xin Curiel. Please do not hesitate to contact me for further questions/concerns. Written by Karlos Gomez, as dictated by Nikunj Keller MD.     Anastasiya Marino MD, [facility]    Patient Care Team:  Endy Robison MD as PCP - General (Family Practice)    [place] [id number]      [address] [place] [address] Drive      [phone] / [phone] Fax

## 2019-08-06 NOTE — LETTER
8/6/19 Patient: Yahir Villalba YOB: 1950 Date of Visit: 8/6/2019 Eagle Perez MD 
[address] [facility] [address] [place] [id number] VIA Facsimile: [phone] Dear Eagle Perez MD, Thank you for referring Ms. Margaret Beard to [facility] for evaluation. My notes for this consultation are attached. If you have questions, please do not hesitate to call me. I look forward to following your patient along with you.  
 
 
Sincerely, 
 
Shahrzad Rodriguez MD

## 2019-11-29 ENCOUNTER — HOSPITAL ENCOUNTER (EMERGENCY)
Age: 69
Discharge: HOME | End: 2019-11-29
Attending: EMERGENCY MEDICINE
Payer: MEDICARE

## 2019-11-29 VITALS
SYSTOLIC BLOOD PRESSURE: 138 MMHG | RESPIRATION RATE: 16 BRPM | DIASTOLIC BLOOD PRESSURE: 67 MMHG | WEIGHT: 239.2 LBS | HEART RATE: 70 BPM | OXYGEN SATURATION: 99 % | BODY MASS INDEX: 42.37 KG/M2 | TEMPERATURE: 98.1 F

## 2019-11-29 DIAGNOSIS — T78.3XXA ANGIOEDEMA, INITIAL ENCOUNTER: Primary | ICD-10-CM

## 2019-11-29 LAB
GLUCOSE BLD STRIP.AUTO-MCNC: 88 MG/DL (ref 65–100)
SERVICE CMNT-IMP: NORMAL

## 2019-11-29 PROCEDURE — A9270 NON-COVERED ITEM OR SERVICE: HCPCS | Performed by: EMERGENCY MEDICINE

## 2019-11-29 PROCEDURE — 74011636637 HC RX REV CODE- 636/637: Performed by: EMERGENCY MEDICINE

## 2019-11-29 PROCEDURE — 74011250637 HC RX REV CODE- 250/637: Performed by: EMERGENCY MEDICINE

## 2019-11-29 PROCEDURE — 82962 GLUCOSE BLOOD TEST: CPT

## 2019-11-29 PROCEDURE — 99284 EMERGENCY DEPT VISIT MOD MDM: CPT

## 2019-11-29 RX ORDER — PREDNISONE 20 MG/1
60 TABLET ORAL
Status: COMPLETED | OUTPATIENT
Start: 2019-11-29 | End: 2019-11-29

## 2019-11-29 RX ORDER — FAMOTIDINE 20 MG/1
20 TABLET, FILM COATED ORAL
Status: COMPLETED | OUTPATIENT
Start: 2019-11-29 | End: 2019-11-29

## 2019-11-29 RX ORDER — DIPHENHYDRAMINE HCL 25 MG
25 CAPSULE ORAL
Qty: 20 CAP | Refills: 0 | Status: SHIPPED | OUTPATIENT
Start: 2019-11-29 | End: 2019-12-09

## 2019-11-29 RX ORDER — FAMOTIDINE 20 MG/1
20 TABLET, FILM COATED ORAL 2 TIMES DAILY
Qty: 20 TAB | Refills: 0 | Status: SHIPPED | OUTPATIENT
Start: 2019-11-29 | End: 2020-05-19

## 2019-11-29 RX ORDER — METHYLPREDNISOLONE 4 MG/1
TABLET ORAL
Qty: 1 DOSE PACK | Refills: 0 | Status: SHIPPED | OUTPATIENT
Start: 2019-11-29 | End: 2020-05-19

## 2019-11-29 RX ORDER — DIPHENHYDRAMINE HCL 25 MG
25 CAPSULE ORAL
Qty: 20 CAP | Refills: 0 | Status: SHIPPED | OUTPATIENT
Start: 2019-11-29 | End: 2019-11-29

## 2019-11-29 RX ORDER — HYDROCHLOROTHIAZIDE 25 MG/1
25 TABLET ORAL DAILY
Qty: 30 TAB | Refills: 0 | Status: SHIPPED | OUTPATIENT
Start: 2019-11-29 | End: 2019-12-29

## 2019-11-29 RX ORDER — DIPHENHYDRAMINE HCL 25 MG
25 CAPSULE ORAL
Status: COMPLETED | OUTPATIENT
Start: 2019-11-29 | End: 2019-11-29

## 2019-11-29 RX ADMIN — FAMOTIDINE 20 MG: 20 TABLET ORAL at 11:34

## 2019-11-29 RX ADMIN — DIPHENHYDRAMINE HYDROCHLORIDE 25 MG: 25 CAPSULE ORAL at 11:34

## 2019-11-29 RX ADMIN — PREDNISONE 60 MG: 20 TABLET ORAL at 11:34

## 2019-11-29 NOTE — ED NOTES
The patient was discharged home by Dr. Lucy Roger in stable condition. The patient is alert and oriented, in no respiratory distress and discharge vital signs obtained. The patient's diagnosis, condition and treatment were explained. The patient expressed understanding. Four prescriptions given. No work/school note given. A discharge plan has been developed. A  was not involved in the process. Aftercare instructions were given. Pt ambulatory out of the ED.

## 2019-11-29 NOTE — DISCHARGE INSTRUCTIONS
Patient Education        Angioedema: Care Instructions  Your Care Instructions    Angioedema is an allergic reaction. It causes swelling and welts in the deep layers of the skin. Angioedema can sometimes occur along with hives. Hives are an allergic reaction in the outer layers of the skin. Angioedema can range from mild to severe. Painful welts can develop on the face. Angioedema can also occur on other parts of the body. In severe cases, the inside of the throat can swell and make it hard to breathe. Many things can cause this condition, including foods, insect bites, and medicines (such as aspirin and some blood pressure medicines). It also can run in families. Sometimes you may know what caused the reaction, but other times you may not know. Follow-up care is a key part of your treatment and safety. Be sure to make and go to all appointments, and call your doctor if you are having problems. It's also a good idea to know your test results and keep a list of the medicines you take. How can you care for yourself at home? · Take your medicines exactly as prescribed. Call your doctor if you think you are having a problem with your medicine. You will get more details on the specific medicines your doctor prescribes. Some medicines used to treat angioedema can make you too sleepy to drive safely. Do not drive if you take medicine that may make you sleepy. · Avoid foods or medicine that may have triggered the swelling. · For comfort:  ? Try taking a cool bath. Or place a cool, wet towel on the swollen area. ? Avoid hot baths and showers. ? Wear loose clothing. · Your doctor may prescribe a shot of epinephrine to carry with you in case you have a severe reaction. Learn how to give yourself the shot and keep it with you at all times. Make sure it has not . When should you call for help?   Give an epinephrine shot if:    · You think you are having a severe allergic reaction.    After giving an epinephrine shot call 911, even if you feel better.   Call 911 if:    · You have symptoms of a severe allergic reaction. These may include:  ? Sudden raised, red areas (hives) all over your body. ? Swelling of the throat, mouth, lips, or tongue. ? Trouble breathing. ? Passing out (losing consciousness). Or you may feel very lightheaded or suddenly feel weak, confused, or restless.     · You have been given an epinephrine shot, even if you feel better.    Call your doctor now or seek immediate medical care if:    · You have symptoms of an allergic reaction, such as:  ? A rash or hives (raised, red areas on the skin). ? Itching. ? Swelling. ? Belly pain, nausea, or vomiting.    Watch closely for changes in your health, and be sure to contact your doctor if:    · You do not get better as expected. Where can you learn more? Go to http://gwen-sunshine.info/. Enter H137 in the search box to learn more about \"Angioedema: Care Instructions. \"  Current as of: April 7, 2019  Content Version: 12.2  © 4490-9492 [employer]. Care instructions adapted under license by [employer] (which disclaims liability or warranty for this information). If you have questions about a medical condition or this instruction, always ask your healthcare professional. [address] any warranty or liability for your use of this information. We hope that we have addressed all of your medical concerns. The examination and treatment you received in the Emergency Department were for an emergent problem and were not intended as complete care. It is important that you follow up with your healthcare provider(s) for ongoing care. If your symptoms worsen or do not improve as expected, and you are unable to reach your usual health care provider(s), you should return to the Emergency Department.       Today's healthcare is undergoing tremendous change, and patient satisfaction surveys are one of the many tools to assess the quality of medical care. You may receive a survey from the [employer] regarding your experience in the Emergency Department. I hope that your experience has been completely positive, particularly the medical care that I provided. As such, please participate in the survey; anything less than excellent does not meet my expectations or intentions. [facility] and [facility] participate in nationally recognized quality of care measures. If your blood pressure is greater than 120/80, as reported below, we urge that you seek medical care to address the potential of high blood pressure, commonly known as hypertension. Hypertension can be hereditary or can be caused by certain medical conditions, pain, stress, or \"white coat syndrome. \"       Please make an appointment with your health care provider(s) for follow up of your Emergency Department visit. VITALS:   Patient Vitals for the past 8 hrs:   Temp Pulse Resp BP SpO2   11/29/19 1116 98.1 °F (36.7 °C) 70 16 152/70 97 %          Thank you for allowing us to provide you with medical care today. We realize that you have many choices for your emergency care needs. Please choose us in the future for any continued health care needs. Celio Dunbar, [address] [place] [address] 20.   Office: [phone]            Recent Results (from the past 24 hour(s))   GLUCOSE, POC    Collection Time: 11/29/19 11:33 AM   Result Value Ref Range    Glucose (POC) 88 65 - 100 mg/dL    Performed by Kennedi Montalvo        No results found.

## 2019-11-29 NOTE — ED TRIAGE NOTES
Last night patient ate a round oscar of pork. She is allergic to pork. Her lips began swelling last night but she was out of benadryl. She denies difficulty breathing.  She does not have an epi pen at home due to cost.

## 2019-11-29 NOTE — ED NOTES
Pt resting on stretcher in no obvious distress. Warm blanket provided for comfort.  Call bell in reach

## 2019-11-29 NOTE — ED PROVIDER NOTES
HPI   80 yo F presents with upper and lower lip swelling onset middle of the night. Hx of same with eating pork. Ate pork yesterday, sausage. No sob or trouble swallowing. No itching. No other complaints.    Is on lisinopril  Past Medical History:   Diagnosis Date    Acid indigestion     or heartburn    Arthritis     Asthma     Constipation     Diabetes ([address]     Frequent episodic tension-type headache     Heart disease     High blood pressure     Multiple stiff joints     Shortness of breath     Sinus pressure     Sore throat     Trouble in sleeping     Wheezing        Past Surgical History:   Procedure Laterality Date    BIOPSY BREAST      CARDIAC SURG PROCEDURE UNLIST      checked for blockages    ENDOSCOPY, COLON, DIAGNOSTIC  05/09         Family History:   Problem Relation Age of Onset    Breast Cancer Mother     Hypertension Mother     Diabetes Father     Coronary Artery Disease Sister     Breast Cancer Sister     Hypertension Brother     Heart Attack Maternal Grandmother     Dementia Paternal Grandmother     Hypertension Brother     Cancer Brother         Pancreas & Liver ca       Social History     Socioeconomic History    Marital status:      Spouse name: Not on file    Number of children: Not on file    Years of education: Not on file    Highest education level: Not on file   Occupational History    Not on file   Social Needs    Financial resource strain: Not on file    Food insecurity:     Worry: Not on file     Inability: Not on file    Transportation needs:     Medical: Not on file     Non-medical: Not on file   Tobacco Use    Smoking status: Never Smoker    Smokeless tobacco: Never Used   Substance and Sexual Activity    Alcohol use: No    Drug use: No    Sexual activity: Yes     Partners: Male   Lifestyle    Physical activity:     Days per week: Not on file     Minutes per session: Not on file    Stress: Not on file   Relationships    Social connections:     Talks on phone: Not on file     Gets together: Not on file     Attends Jain service: Not on file     Active member of club or organization: Not on file     Attends meetings of clubs or organizations: Not on file     Relationship status: Not on file    Intimate partner violence:     Fear of current or ex partner: Not on file     Emotionally abused: Not on file     Physically abused: Not on file     Forced sexual activity: Not on file   Other Topics Concern    Not on file   Social History Narrative    Not on file         ALLERGIES: Pork/porcine containing products and Pcn [penicillins]    Review of Systems   Constitutional: Negative for chills and fever. HENT: Positive for facial swelling. Negative for sore throat and trouble swallowing. Respiratory: Negative for cough and shortness of breath. Cardiovascular: Negative for chest pain. Gastrointestinal: Negative for abdominal pain, diarrhea, nausea and vomiting. Skin: Negative for rash. All other systems reviewed and are negative.       Vitals:    11/29/19 1116   BP: 152/70   Pulse: 70   Resp: 16   Temp: 98.1 °F (36.7 °C)   SpO2: 97%            Physical Exam   Physical Examination: General appearance - alert, well appearing, and in no distress, oriented to person, place, and time and normal appearing weight  Eyes - pupils equal and reactive, extraocular eye movements intact  HEENT-diffuse upper and lower lip swelling, no tongue swelling or stridor  Neck - supple, no significant adenopathy  Chest - clear to auscultation, no wheezes, rales or rhonchi, symmetric air entry  Heart - normal rate, regular rhythm, normal S1, S2, no murmurs, rubs, clicks or gallops  Abdomen - soft, nontender, nondistended, no masses or organomegaly  Back exam - full range of motion, no tenderness, palpable spasm or pain on motion  Neurological - alert, oriented, normal speech, no focal findings or movement disorder noted  Musculoskeletal - no joint tenderness, deformity or swelling  Extremities - peripheral pulses normal, no pedal edema, no clubbing or cyanosis  Skin - normal coloration and turgor, no rashes, no suspicious skin lesions noted  MDM  Number of Diagnoses or Management Options  Angioedema, initial encounter:   Critical Care  Total time providing critical care: 30-74 minutes    Patient Progress  Patient progress: improved         Procedures  2:13 PM  Pt observed in ED, she reports no SOB and lip swelling improving. Will stop lisinopril and have f/u with pcp. Pt agrees to return to ED for worsening symptoms.      Total critical care time spent exclusive of procedures:  30 min

## 2019-11-29 NOTE — ED NOTES
Pt ambulatory to restroom. Reports that she thinks her tongue feels more swollen than before. Dr. Ivory Torres notified.

## 2020-02-25 ENCOUNTER — HOSPITAL ENCOUNTER (OUTPATIENT)
Dept: LAB | Age: 70
Discharge: HOME | End: 2020-02-25
Payer: MEDICARE

## 2020-02-25 PROCEDURE — 80061 LIPID PANEL: CPT

## 2020-02-25 PROCEDURE — 80076 HEPATIC FUNCTION PANEL: CPT

## 2020-02-25 PROCEDURE — 36415 COLL VENOUS BLD VENIPUNCTURE: CPT

## 2020-02-26 LAB
ALBUMIN SERPL-MCNC: 4.4 G/DL (ref 3.8–4.8)
ALP SERPL-CCNC: 83 IU/L (ref 39–117)
ALT SERPL-CCNC: 23 IU/L (ref 0–32)
AST SERPL-CCNC: 18 IU/L (ref 0–40)
BILIRUB DIRECT SERPL-MCNC: 0.13 MG/DL (ref 0–0.4)
BILIRUB SERPL-MCNC: 0.4 MG/DL (ref 0–1.2)
CHOLEST SERPL-MCNC: 134 MG/DL (ref 100–199)
HDLC SERPL-MCNC: 43 MG/DL
INTERPRETATION, 910389: NORMAL
LDLC SERPL CALC-MCNC: 69 MG/DL (ref 0–99)
PROT SERPL-MCNC: 6.7 G/DL (ref 6–8.5)
TRIGL SERPL-MCNC: 110 MG/DL (ref 0–149)
VLDLC SERPL CALC-MCNC: 22 MG/DL (ref 5–40)

## 2020-05-19 ENCOUNTER — OFFICE VISIT (OUTPATIENT)
Dept: PRIMARY CARE CLINIC | Age: 70
End: 2020-05-19

## 2020-05-19 DIAGNOSIS — Z01.818 PRE-OP EXAM: Primary | ICD-10-CM

## 2020-05-19 RX ORDER — QUETIAPINE FUMARATE 25 MG/1
50 TABLET, FILM COATED ORAL
COMMUNITY

## 2020-05-19 NOTE — PROGRESS NOTES
s- sent by Dr. Ferrera Client for covid test prior to colonoscopy on 5/26  Patient denies symptoms for covid    o- vss,  nad  Skin color normal  Conversant w/o sob or increase wob  Calm and cooperative    A/p- pre procedure covid testing

## 2020-05-19 NOTE — PROGRESS NOTES
[address]  Endoscopy Preprocedure Instructions      1. On the day of your surgery, please report to registration located on the 2nd floor of the  [facility]. yes    2. You must have a responsible adult to drive you to the hospital, stay at the hospital during your procedure and drive you home. If they leave your procedure will not be started (no exceptions). yes    3. Do not have anything to eat or drink (including water, gum, mints, coffee, and juice) after midnight. This does not apply to the medications you were instructed to take by your physician. yesIf you are currently taking Plavix, Coumadin, Aspirin, or other blood-thinning agents, contact your physician for special instructions. yes    4. If you are having a procedure that requires bowel prep: We recommend that you have only clear liquids the day before your procedure and begin your bowel prep by 5:00 pm.  You may continue to drink clear liquids until midnight. If for any reason you are not able to complete your prep please notify your physician immediately. yes    5. Have a list of all current medications, including vitamins, herbal supplements and any other over the counter medications. yes    6. If you wear glasses, contacts, dentures and/or hearing aids, they may be removed prior to procedure, please bring a case to store them in. yes    7. You should understand that if you do not follow these instructions your procedure may be cancelled. If your physical condition changes (I.e. fever, cold or flu) please contact your doctor as soon as possible. 8. It is important that you be on time.   If for any reason you are unable to keep your appointment please call )- the day of or your physicians office prior to your scheduled procedure

## 2020-05-21 LAB — SARS-COV-2, NAA: NOT DETECTED

## 2020-05-26 ENCOUNTER — ANESTHESIA (OUTPATIENT)
Dept: ENDOSCOPY | Age: 70
End: 2020-05-26
Payer: MEDICARE

## 2020-05-26 ENCOUNTER — ANESTHESIA EVENT (OUTPATIENT)
Dept: ENDOSCOPY | Age: 70
End: 2020-05-26
Payer: MEDICARE

## 2020-05-26 ENCOUNTER — HOSPITAL ENCOUNTER (OUTPATIENT)
Age: 70
Setting detail: OUTPATIENT SURGERY
Discharge: HOME | End: 2020-05-26
Attending: SPECIALIST | Admitting: SPECIALIST
Payer: MEDICARE

## 2020-05-26 VITALS
DIASTOLIC BLOOD PRESSURE: 65 MMHG | OXYGEN SATURATION: 98 % | SYSTOLIC BLOOD PRESSURE: 125 MMHG | BODY MASS INDEX: 43.55 KG/M2 | HEART RATE: 61 BPM | TEMPERATURE: 97.7 F | WEIGHT: 245.81 LBS | RESPIRATION RATE: 18 BRPM | HEIGHT: 63 IN

## 2020-05-26 LAB
GLUCOSE BLD STRIP.AUTO-MCNC: 101 MG/DL (ref 65–100)
SERVICE CMNT-IMP: ABNORMAL

## 2020-05-26 PROCEDURE — 76040000019: Performed by: SPECIALIST

## 2020-05-26 PROCEDURE — 74011000250 HC RX REV CODE- 250: Performed by: NURSE ANESTHETIST, CERTIFIED REGISTERED

## 2020-05-26 PROCEDURE — 77030013992 HC SNR POLYP ENDOSC BSC -B: Performed by: SPECIALIST

## 2020-05-26 PROCEDURE — 74011250636 HC RX REV CODE- 250/636: Performed by: SPECIALIST

## 2020-05-26 PROCEDURE — 88305 TISSUE EXAM BY PATHOLOGIST: CPT

## 2020-05-26 PROCEDURE — 74011250636 HC RX REV CODE- 250/636: Performed by: NURSE ANESTHETIST, CERTIFIED REGISTERED

## 2020-05-26 PROCEDURE — 82962 GLUCOSE BLOOD TEST: CPT

## 2020-05-26 PROCEDURE — 76060000031 HC ANESTHESIA FIRST 0.5 HR: Performed by: SPECIALIST

## 2020-05-26 RX ORDER — HYDROCHLOROTHIAZIDE 25 MG/1
25 TABLET ORAL DAILY
COMMUNITY
End: 2022-08-31 | Stop reason: ALTCHOICE

## 2020-05-26 RX ORDER — PROPOFOL 10 MG/ML
INJECTION, EMULSION INTRAVENOUS
Status: DISCONTINUED | OUTPATIENT
Start: 2020-05-26 | End: 2020-05-26 | Stop reason: HOSPADM

## 2020-05-26 RX ORDER — MIDAZOLAM HYDROCHLORIDE 1 MG/ML
.25-5 INJECTION, SOLUTION INTRAMUSCULAR; INTRAVENOUS AS NEEDED
Status: DISCONTINUED | OUTPATIENT
Start: 2020-05-26 | End: 2020-05-26 | Stop reason: HOSPADM

## 2020-05-26 RX ORDER — PROPOFOL 10 MG/ML
INJECTION, EMULSION INTRAVENOUS AS NEEDED
Status: DISCONTINUED | OUTPATIENT
Start: 2020-05-26 | End: 2020-05-26 | Stop reason: HOSPADM

## 2020-05-26 RX ORDER — FENTANYL CITRATE 50 UG/ML
25 INJECTION, SOLUTION INTRAMUSCULAR; INTRAVENOUS AS NEEDED
Status: DISCONTINUED | OUTPATIENT
Start: 2020-05-26 | End: 2020-05-26 | Stop reason: HOSPADM

## 2020-05-26 RX ORDER — SODIUM CHLORIDE 9 MG/ML
50 INJECTION, SOLUTION INTRAVENOUS CONTINUOUS
Status: DISCONTINUED | OUTPATIENT
Start: 2020-05-26 | End: 2020-05-26 | Stop reason: HOSPADM

## 2020-05-26 RX ORDER — FLUMAZENIL 0.1 MG/ML
0.2 INJECTION INTRAVENOUS
Status: DISCONTINUED | OUTPATIENT
Start: 2020-05-26 | End: 2020-05-26 | Stop reason: HOSPADM

## 2020-05-26 RX ORDER — SODIUM CHLORIDE 9 MG/ML
INJECTION, SOLUTION INTRAVENOUS
Status: DISCONTINUED | OUTPATIENT
Start: 2020-05-26 | End: 2020-05-26 | Stop reason: HOSPADM

## 2020-05-26 RX ORDER — NALOXONE HYDROCHLORIDE 0.4 MG/ML
0.4 INJECTION, SOLUTION INTRAMUSCULAR; INTRAVENOUS; SUBCUTANEOUS
Status: DISCONTINUED | OUTPATIENT
Start: 2020-05-26 | End: 2020-05-26 | Stop reason: HOSPADM

## 2020-05-26 RX ORDER — LIDOCAINE HYDROCHLORIDE 20 MG/ML
INJECTION, SOLUTION EPIDURAL; INFILTRATION; INTRACAUDAL; PERINEURAL AS NEEDED
Status: DISCONTINUED | OUTPATIENT
Start: 2020-05-26 | End: 2020-05-26 | Stop reason: HOSPADM

## 2020-05-26 RX ORDER — DEXTROMETHORPHAN/PSEUDOEPHED 2.5-7.5/.8
1.2 DROPS ORAL
Status: DISCONTINUED | OUTPATIENT
Start: 2020-05-26 | End: 2020-05-26 | Stop reason: HOSPADM

## 2020-05-26 RX ADMIN — SODIUM CHLORIDE 50 ML/HR: 900 INJECTION, SOLUTION INTRAVENOUS at 07:05

## 2020-05-26 RX ADMIN — PROPOFOL 70 MG: 10 INJECTION, EMULSION INTRAVENOUS at 08:00

## 2020-05-26 RX ADMIN — SODIUM CHLORIDE: 9 INJECTION, SOLUTION INTRAVENOUS at 07:54

## 2020-05-26 RX ADMIN — LIDOCAINE HYDROCHLORIDE 40 MG: 20 INJECTION, SOLUTION INTRAVENOUS at 08:00

## 2020-05-26 RX ADMIN — PROPOFOL 150 MCG/KG/MIN: 10 INJECTION, EMULSION INTRAVENOUS at 08:00

## 2020-05-26 NOTE — INTERVAL H&P NOTE
Pre-Endoscopy H&P Update  Chief complaint/HPI/ROS:  The indication for the procedure, the patient's history and the patient's current medications are reviewed prior to the procedure and that data is reported on the H&P to which this document is attached. Any significant complaints with regard to organ systems will be noted, and if not mentioned then a review of systems is not contributory.   Past Medical History:   Diagnosis Date    Acid indigestion     or heartburn    Arthritis     hand and feet    Asthma     Constipation     Diabetes (HCC)     GERD (gastroesophageal reflux disease)     Heart disease     High blood pressure     Low back pain, episodic     Multiple stiff joints     Nausea & vomiting     Shortness of breath     Sinus pressure     Sleep apnea     cpap @ hs    Sore throat     Stroke [facility])     TIA 2017    Trouble in sleeping     Wheezing       Past Surgical History:   Procedure Laterality Date    BIOPSY BREAST      BREAST SURGERY PROCEDURE UNLISTED      l lumpectomy, l biopsy    BREAST SURGERY PROCEDURE UNLISTED      r surgery to the nipple    CARDIAC SURG PROCEDURE UNLIST      checked for blockages    ENDOSCOPY, COLON, DIAGNOSTIC  05/09     Social   Social History     Tobacco Use    Smoking status: Never Smoker    Smokeless tobacco: Never Used   Substance Use Topics    Alcohol use: Yes     Comment: rare occasions      Family History   Problem Relation Age of Onset    Breast Cancer Mother     Hypertension Mother     Cancer Mother     Diabetes Father     Coronary Artery Disease Sister     Breast Cancer Sister     Cancer Sister     Hypertension Brother     Heart Attack Maternal Grandmother     Dementia Paternal Grandmother     Hypertension Brother     Cancer Brother         Pancreas & Liver ca    Cancer Maternal Aunt     Cancer Paternal Aunt       Allergies   Allergen Reactions    Lisinopril Angioedema    Pork/Porcine Containing Products Angioedema    Pcn [Penicillins] Rash      Prior to Admission Medications   Prescriptions Last Dose Informant Patient Reported? Taking? Biotin 2,500 mcg cap 2020  Yes Yes   Sig: Take  by mouth. Cholecalciferol, Vitamin D3, (VITAMIN D) 1,000 unit Cap 2020  Yes Yes   Sig: Take 2,000 Units by mouth. LOSARTAN PO 2020 at Unknown time  Yes Yes   Sig: Take  by mouth daily. QUEtiapine (SEROquel) 25 mg tablet 2020 at Unknown time  Yes Yes   Sig: Take 25 mg by mouth nightly. ascorbic acid, vitamin C, (VITAMIN C) 250 mg tablet 2020 at Unknown time  Yes Yes   Sig: Take 250 mg by mouth. aspirin 81 mg tablet 2020  Yes Yes   Sig: Take 81 mg by mouth. carvedilol (COREG) 25 mg tablet 2020 at Unknown time  Yes Yes   Sig: Take 25 mg by mouth two (2) times daily (with meals). cyclobenzaprine (FLEXERIL) 10 mg tablet Unknown at Unknown time  Yes No   Sig: Take  by mouth three (3) times daily as needed for Muscle Spasm(s). diltiazem CD (CARDIZEM CD) 240 mg ER capsule 2020 at Unknown time  Yes Yes   Sig: Take 300 mg by mouth daily. hydroCHLOROthiazide (HYDRODIURIL) 25 mg tablet 2020 at Unknown time  Yes Yes   Sig: Take 25 mg by mouth daily. metFORMIN (GLUCOPHAGE) 500 mg tablet 2020 at Unknown time  Yes Yes   Sig: Take 500 mg by mouth daily (with breakfast). multivitamin (ONE A DAY) tablet 2020  Yes Yes   Sig: Take 1 Tab by mouth daily. nitroglycerin (NITROSTAT) 0.4 mg SL tablet Not Taking at Unknown time  No No   Si Tab by SubLINGual route every five (5) minutes as needed for Chest Pain. omega 3-dha-epa-fish oil 100-160-1,000 mg cap 2020  Yes Yes   Sig: Take  by mouth. omeprazole (PRILOSEC) 20 mg capsule 2020 at Unknown time  Yes Yes   Sig: Take 20 mg by mouth daily. potassium chloride SR (KLOR-CON 10) 10 mEq tablet 2020 at Unknown time  Yes Yes   Sig: Take 10 mEq by mouth two (2) times a day.    simvastatin (ZOCOR) 10 mg tablet 2020  Yes Yes   Sig: Take 20 mg by mouth nightly. Facility-Administered Medications: None       PHYSICAL EXAM:  The patient is examined immediately prior to the procedure. Visit Vitals  /69   Pulse 70   Temp 98.1 °F (36.7 °C)   Resp 9   Ht 5' 3\" (1.6 m)   Wt 111.5 kg (245 lb 13 oz)   SpO2 99%   Breastfeeding No   BMI 43.54 kg/m²     Gen: Appears comfortable, no distress. Pulm: comfortable respirations with no abnormal audible breath sounds  HEART: well perfused, no abnormal audible heart sounds  GI: abdomen flat. PLAN:  Informed consent discussion held, patient afforded an opportunity to ask questions and all questions answered. After being advised of the risks, benefits, and alternatives, the patient requested that we proceed and indicated so on a written consent form. Will proceed with procedure as planned.   Srini Mohr MD

## 2020-05-26 NOTE — PROCEDURES
[address] [facility] Cramer MD  [phone]      May 26, 2020    Colonoscopy Procedure Note  David Crews  :  1950  [place] Medical Record Number: [id number]    Indications:     Screening colonoscopy  PCP:  Krystal Rodriguez MD  Anesthesia/Sedation: Conscious Sedation/Moderate Sedation/GETA, see notes  Endoscopist:   [id number]  Complications:  None  Estimated Blood Loss:  None    Permit:  The indications, risks, benefits and alternatives were reviewed with the patient or their decision maker who was provided an opportunity to ask questions and all questions were answered. The specific risks of colonoscopy with conscious sedation were reviewed, including but not limited to anesthetic complication, bleeding, adverse drug reaction, missed lesion, infection, IV site reactions, and intestinal perforation which would lead to the need for surgical repair. Alternatives to colonoscopy including radiographic imaging, observation without testing, or laboratory testing were reviewed including the limitations of those alternatives. After considering the options and having all their questions answered, the patient or their decision maker provided both verbal and written consent to proceed. Procedure in Detail:  After obtaining informed consent, positioning of the patient in the left lateral decubitus position, and conduction of a pre-procedure pause or \"time out\" the endoscope was introduced into the anus and advanced to the cecum, which was identified by the ileocecal valve and appendiceal orifice. The quality of the colonic preparation was adequate. A careful inspection was made as the colonoscope was withdrawn, findings and interventions are described below. Findings:   Ascending polyp 5mm and sigmoid polyp 7mm both taken with cold snare and all samples retrieved and hemostasis confirmed.   There is diverticulosis in the sigmoid colon without complications such as bleeding, inflammatory change, or luminal narrowing. Specimens:    See above    Complications:   None; patient tolerated the procedure well. Impression:  Colon polyps and diverticulosis. Recommendations:     - Await pathology. - High fiber diet. Thank you for entrusting me with this patient's care. Please do not hesitate to contact me with any questions or if I can be of assistance with any of your other patients' GI needs. Signed By: Hieu Leyva MD                        May 26, 2020      Surgical assistant none. Implants none unless specified.

## 2020-05-26 NOTE — ROUTINE PROCESS
Torri Maldonadouser  1950  [id number]    Situation:  Verbal report received from: Shirlene Wing RN  Procedure: Procedure(s):  COLONOSCOPY  ENDOSCOPIC POLYPECTOMY    Background:    Preoperative diagnosis: PERSONAL HISTORY OF COLONIC POLYPS  Postoperative diagnosis: Diverticulosis  Hemorrhoids  Colon Polyps x 2    :  Dr. Cyril Moran  Assistant(s): Endoscopy Technician-1: Gloria Campoverde  Endoscopy RN-1: Shahida Jo RN    Specimens:   ID Type Source Tests Collected by Time Destination   1 : Ascending Colon Polyp Preservative   Rhiannon Haynes MD 5/26/2020 0081 Pathology   2 : Sigmoid Polyp Preservative   Rhiannon Haynes MD 5/26/2020 5359 Pathology     H. Pylori  no    Assessment:  Intra-procedure medications     Anesthesia gave intra-procedure sedation and medications, see anesthesia flow sheet yes    Intravenous fluids: NS@ KVO     Vital signs stable     Abdominal assessment: round and soft     Recommendation:  Discharge patient per MD order. Family or Friend   Permission to share finding with family or friend yes    Endoscopy discharge instructions have been reviewed and given to patient. The patient verbalized understanding and acceptance of instructions.

## 2020-05-26 NOTE — DISCHARGE INSTRUCTIONS
[address] [facility] Jovel MD  [phone]      May 26, 2020    Cahna Neumann  YOB: 1950    COLONOSCOPY DISCHARGE INSTRUCTIONS    If there is redness at IV site you should apply warm compress to area. If redness or soreness persist contact Dr. Audrey Jovel' or your primary care doctor. There may be a slight amount of blood passed from the rectum. Gaseous discomfort may develop, but walking, belching will help relieve this. You may not operate a vehicle for 12 hours  You may not operate machinery or dangerous appliances for rest of today  You may not drink alcoholic beverages for 12 hours  Avoid making any critical decisions for 24 hours    DIET:  You may resume your normal diet, but some patients find that heavy or large meals may lead to indigestion or vomiting. I suggest a light meal as first food intake. MEDICATIONS:  The use of some over-the-counter pain medication may lead to bleeding after colon biopsies or polyp removal.  Tylenol (also called acetaminophen) is safe to take even if you have had colonoscopy with polyp removal.  Based on the procedure you had today you may not safely take aspirin or aspirin-like products for the next ten (10) days. Remember that Tylenol (also called acetaminophen) is safe to take after colonoscopy even if you have had biopsies or polyps removed. ACTIVITY:  You may resume your normal household activities, but it is recommended that you spend the remainder of the day resting -  avoid any strenuous activity. CALL DR. Gretchen Crawford' OFFICE IF:  Increasing pain, nausea, vomiting  Abdominal distension (swelling)  Significant new or increased bleeding (oral or rectal)  Fever/Chills  Chest pain/shortness of breath                       Additional instructions:   Hold aspirin 10 days. We found 2 colon polyps and removed them today.   I will contact you with the results of the polyps in about a week. It was an honor to be your doctor today. Please let me or my office staff know if you have any feedback about today's procedure. Melani Rodriguez MD    Colonoscopy saves lives, and can prevent colon cancer. Everyone aged 48 or older needs colonoscopy.   Tell your family and friends: get the test!

## 2020-05-26 NOTE — ANESTHESIA PREPROCEDURE EVALUATION
Relevant Problems   No relevant active problems       Anesthetic History               Review of Systems / Medical History  Patient summary reviewed and nursing notes reviewed    Pulmonary        Sleep apnea: CPAP    Asthma        Neuro/Psych         TIA    Comments: TIA 2017 Cardiovascular    Hypertension: well controlled          CAD         GI/Hepatic/Renal     GERD: well controlled           Endo/Other    Diabetes: well controlled, type 2    Morbid obesity and arthritis     Other Findings              Physical Exam    Airway  Mallampati: I    Neck ROM: normal range of motion   Mouth opening: Normal     Cardiovascular    Rhythm: regular  Rate: normal         Dental    Dentition: Bridges     Pulmonary  Breath sounds clear to auscultation               Abdominal         Other Findings            Anesthetic Plan    ASA: 3  Anesthesia type: MAC            Anesthetic plan and risks discussed with: Patient      Informed consent obtained.

## 2020-05-26 NOTE — H&P
81 y.o. female for open access colonoscopy for screening   Additional data for completion of the targeted pre-endoscopy H&P will be provided under 'H&P interval notes'. Please see that document which will be attached to this. Víctor Reed MD  Last colon 14 years ago Katie Ortiz [place]. Overdue for higher than average risk screening for colon neoplasia.

## 2020-05-26 NOTE — PERIOP NOTES
Report from [facility], CRNA, see anesthesia record. ABD remains soft and non-tender post procedure. Pt has no complaints at this time and tolerated the procedure well. Endoscope was pre-cleaned at bedside immediately following procedure by Radha Brooks.

## 2020-05-26 NOTE — ANESTHESIA POSTPROCEDURE EVALUATION
Procedure(s):  COLONOSCOPY  ENDOSCOPIC POLYPECTOMY. MAC    Anesthesia Post Evaluation      Multimodal analgesia: multimodal analgesia not used between 6 hours prior to anesthesia start to PACU discharge  Patient location during evaluation: PACU  Patient participation: complete - patient participated  Level of consciousness: awake and alert  Pain score: 0  Pain management: adequate  Airway patency: patent  Anesthetic complications: no  Cardiovascular status: hemodynamically stable and acceptable  Respiratory status: acceptable  Hydration status: acceptable  Comments: Patient seen and evaluated; no concerns. Post anesthesia nausea and vomiting:  none      Vitals Value Taken Time   /65 5/26/2020  8:32 AM   Temp 36.5 °C (97.7 °F) 5/26/2020  8:17 AM   Pulse 61 5/26/2020  8:33 AM   Resp 19 5/26/2020  8:33 AM   SpO2 98 % 5/26/2020  8:33 AM   Vitals shown include unvalidated device data.

## 2020-07-21 ENCOUNTER — OFFICE VISIT (OUTPATIENT)
Dept: CARDIOLOGY CLINIC | Age: 70
End: 2020-07-21

## 2020-07-21 VITALS
BODY MASS INDEX: 44.12 KG/M2 | SYSTOLIC BLOOD PRESSURE: 122 MMHG | HEIGHT: 63 IN | DIASTOLIC BLOOD PRESSURE: 68 MMHG | HEART RATE: 68 BPM | OXYGEN SATURATION: 98 % | WEIGHT: 249 LBS

## 2020-07-21 DIAGNOSIS — G47.33 OSA (OBSTRUCTIVE SLEEP APNEA): ICD-10-CM

## 2020-07-21 DIAGNOSIS — E11.9 CONTROLLED TYPE 2 DIABETES MELLITUS WITHOUT COMPLICATION, WITHOUT LONG-TERM CURRENT USE OF INSULIN (HCC): ICD-10-CM

## 2020-07-21 DIAGNOSIS — I10 ESSENTIAL HYPERTENSION: Primary | ICD-10-CM

## 2020-07-21 DIAGNOSIS — I20.9 ANGINA PECTORIS (HCC): ICD-10-CM

## 2020-07-21 DIAGNOSIS — E78.5 DYSLIPIDEMIA: ICD-10-CM

## 2020-07-21 RX ORDER — ALLOPURINOL 300 MG/1
300 TABLET ORAL DAILY
COMMUNITY

## 2020-07-21 RX ORDER — MINERAL OIL
180 ENEMA (ML) RECTAL DAILY
COMMUNITY

## 2020-07-21 RX ORDER — DILTIAZEM HYDROCHLORIDE 300 MG/1
300 CAPSULE, EXTENDED RELEASE ORAL DAILY
COMMUNITY

## 2020-07-21 NOTE — PROGRESS NOTES
Visit Vitals  /68 (BP 1 Location: Left arm, BP Patient Position: Sitting)   Pulse 68   Ht 5' 3\" (1.6 m)   Wt 249 lb (112.9 kg)   SpO2 98%   BMI 44.11 kg/m²         Chest pain: YES, had to use NITRO a couple days (last episode 2-3 wks ago)  Shortness of breath: no  Edema: no  Palpitations: no  Dizziness: no     New diagnosis/Surgeries: no    ER/Hospitalizations: no    Refills:NITRO

## 2020-07-21 NOTE — PROGRESS NOTES
ATTENTION:   This medical record was transcribed using an electronic medical records/speech recognition system. Although proofread, it may and can contain electronic, spelling and other errors. Corrections may be executed at a later time. Please feel free to contact us for any clarifications as needed. ICD-10-CM ICD-9-CM   1. Essential hypertension  I10 401.9   2. Dyslipidemia  E78.5 272.4   3. Controlled type 2 diabetes mellitus without complication, without long-term current use of insulin (HCC)  E11.9 250.00   4. Angina pectoris (HCC)  I20.9 413.9   5. ISAIAH (obstructive sleep apnea)  G47.33 327.23            Lester Najjar is a 81 y.o. female with HTN, angina, asthma, dyslipidemia, and obesity referred for follow up. Cardiac risk factors: dyslipidemia, obesity, hypertension, post-menopausal  I have personally obtained the history from the patient. HISTORY OF PRESENTING ILLNESS     Overall the pt states she is doing well but does state that recently she had an episode of chest discomfort for which she took nitroglycerin. She thinks she was lying down or possibly and her  got her upset at the time. She has not had any further discomfort or any other associated symptoms. [place]            ACTIVE PROBLEM LIST     Patient Active Problem List    Diagnosis Date Noted    Obesity, morbid ([facility] [address] 08/06/2019    HTN (hypertension) 07/06/2011    Angina pectoris ([facility] [address] 07/06/2011    Asthma 07/06/2011    Obesity 07/06/2011    Elevated cholesterol 07/06/2011    HSV-2 (herpes simplex virus 2) infection 07/06/2011    Osteopenia 07/06/2011           PAST MEDICAL HISTORY     Past Medical History:   Diagnosis Date    Acid indigestion     or heartburn    Arthritis     hand and feet    Asthma     Constipation     Diabetes (HCC)     GERD (gastroesophageal reflux disease)     Heart disease     High blood pressure     Low back pain, episodic     Multiple stiff joints     Nausea & vomiting     Shortness of breath     Sinus pressure     Sleep apnea     cpap @ hs    Sore throat     Stroke [facility])     TIA 2017    Trouble in sleeping     Wheezing            PAST SURGICAL HISTORY     Past Surgical History:   Procedure Laterality Date    BIOPSY BREAST      BREAST SURGERY PROCEDURE UNLISTED      l lumpectomy, l biopsy    BREAST SURGERY PROCEDURE UNLISTED      r surgery to the nipple    CARDIAC SURG PROCEDURE UNLIST      checked for blockages    COLONOSCOPY N/A 5/26/2020    COLONOSCOPY performed by Wayne Rodriguez MD at [address] [place] [address] [place], COLON, DIAGNOSTIC  05/09          ALLERGIES     Allergies   Allergen Reactions    Lisinopril Angioedema    Pork/Porcine Containing Products Angioedema    Pcn [Penicillins] Rash          FAMILY HISTORY     Family History   Problem Relation Age of Onset    Breast Cancer Mother     Hypertension Mother     Cancer Mother     Diabetes Father     Coronary Artery Disease Sister     Breast Cancer Sister     Cancer Sister     Hypertension Brother     Heart Attack Maternal Grandmother     Dementia Paternal Grandmother     Hypertension Brother     Cancer Brother         Pancreas & Liver ca    Cancer Maternal Aunt     Cancer Paternal Aunt     negative for cardiac disease       SOCIAL HISTORY     Social History     Socioeconomic History    Marital status:      Spouse name: Not on file    Number of children: Not on file    Years of education: Not on file    Highest education level: Not on file   Tobacco Use    Smoking status: Never Smoker    Smokeless tobacco: Never Used   Substance and Sexual Activity    Alcohol use: Yes     Comment: rare occasions    Drug use: No    Sexual activity: Yes     Partners: Male         MEDICATIONS     Current Outpatient Medications   Medication Sig    allopurinoL (ZYLOPRIM) 300 mg tablet Take 300 mg by mouth daily.     fexofenadine (ALLEGRA) 180 mg tablet Take 180 mg by mouth daily.  dilTIAZem ER (Tiadylt ER) 300 mg capsule Take 300 mg by mouth daily.  LOSARTAN PO Take 100 mg by mouth daily.  hydroCHLOROthiazide (HYDRODIURIL) 25 mg tablet Take 25 mg by mouth daily.  QUEtiapine (SEROquel) 25 mg tablet Take 25 mg by mouth nightly.  nitroglycerin (NITROSTAT) 0.4 mg SL tablet 1 Tab by SubLINGual route every five (5) minutes as needed for Chest Pain.  multivitamin (ONE A DAY) tablet Take 1 Tab by mouth daily.  ascorbic acid, vitamin C, (VITAMIN C) 250 mg tablet Take 250 mg by mouth.  Biotin 2,500 mcg cap Take  by mouth.  cyclobenzaprine (FLEXERIL) 10 mg tablet Take  by mouth three (3) times daily as needed for Muscle Spasm(s).  omega 3-dha-epa-fish oil 100-160-1,000 mg cap Take  by mouth.  carvedilol (COREG) 25 mg tablet Take 25 mg by mouth two (2) times daily (with meals).  omeprazole (PRILOSEC) 20 mg capsule Take 20 mg by mouth daily.  simvastatin (ZOCOR) 10 mg tablet Take 20 mg by mouth nightly.  metFORMIN (GLUCOPHAGE) 500 mg tablet Take 500 mg by mouth daily (with breakfast).  potassium chloride SR (KLOR-CON 10) 10 mEq tablet Take 10 mEq by mouth two (2) times a day.  Cholecalciferol, Vitamin D3, (VITAMIN D) 1,000 unit Cap Take 2,000 Units by mouth.  aspirin 81 mg tablet Take 81 mg by mouth.  dilTIAZem ER (Cardizem CD) 240 mg capsule Take 300 mg by mouth daily. No current facility-administered medications for this visit. I have reviewed the nurses notes, vitals, problem list, allergy list, medical history, family, social history and medications. REVIEW OF SYMPTOMS      General: Pt denies excessive weight gain or loss. Pt is able to conduct ADL's  HEENT: Denies blurred vision, headaches, hearing loss, epistaxis and difficulty swallowing. Respiratory: Denies cough, congestion, shortness of breath, BERMUDEZ, wheezing or stridor.   Cardiovascular: Denies precordial pain, palpitations, edema or PND  Gastrointestinal: Denies poor appetite, indigestion, abdominal pain or blood in stool  Genitourinary: Denies hematuria, dysuria, increased urinary frequency  Musculoskeletal: Denies joint pain or swelling from muscles or joints  Neurologic: Denies tremor, paresthesias, headache, or sensory motor disturbance  Psychiatric: Denies confusion, insomnia, depression  Integumentray: Denies rash, itching or ulcers. Hematologic: Denies easy bruising, bleeding     PHYSICAL EXAMINATION      Vitals:    07/21/20 0945   BP: 122/68   Pulse: 68   SpO2: 98%   Weight: 249 lb (112.9 kg)   Height: 5' 3\" (1.6 m)     General: Well developed, in no acute distress. HEENT: No jaundice, oral mucosa moist, no oral ulcers  Neck: Supple, no stiffness, no lymphadenopathy, supple  Heart:  Normal S1/S2 negative S3 or S4. Regular, no murmur, gallop or rub, no jugular venous distention  Respiratory: Clear bilaterally x 4, no wheezing or rales  Abdomen:   Soft, non-tender, bowel sounds are active. Extremities:  No edema, normal cap refill, no cyanosis. Musculoskeletal: No clubbing, no deformities  Neuro: A&Ox3, speech clear, gait stable, cooperative, no focal neurologic deficits  Skin: Skin color is normal. No rashes or lesions. Non diaphoretic, moist.  Vascular: 2+ pulses symmetric in all extremities           DIAGNOSTIC DATA     1. Stress Test  3/7/13- NM stress- no ischemia, EF 68%  10/22/18-Cardiolite-no ischemia, EF 75%    2. Echo  2/25/13- EF 55%, MV mild annular calcification, TR mild, PAP 35 mmHg    3. LE Doppler  5/10/18- No evidence of right lower extremity vein thrombosis. 4. Cholesterol   9/5/18: , HDL 45, LDL 83, .  6/5/19- , HDL 42, LDL 79,     5. Coronary CT angio/Ca++ scoring  (6/24/19): 1. Left main originate normally from left coronary cusp and is normal size  without any significant atherosclerotic plaque or calcification. 2. The LAD is normal size with significant tortuosity.  There is a mild soft  plaque in the mid LAD without any significant stenosis. There is no significant  calcification. The D1 and D2 diagonal branches were seen without any significant  lesion or calcification. 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 3. The left circumflex is small size vessel with significant tortuosity. There  is no significant lesion or calcification. The OM1 branch is very small without  any significant disease. 4. The RCA is normal size vessel and originate normally from the right coronary  cusp. The RCA is highly tortuous. The mid RCA demonstrate mild soft plaque  without any significant luminal stenosis. CALCIUM SCORE =0         LABORATORY DATA            Lab Results   Component Value Date/Time    WBC 9.4 06/04/2019 12:25 PM    HGB 14.1 06/04/2019 12:25 PM    HCT 41.1 06/04/2019 12:25 PM    PLATELET 944 16/04/6427 12:25 PM    MCV 89.7 06/04/2019 12:25 PM      Lab Results   Component Value Date/Time    Sodium 140 06/04/2019 12:25 PM    Potassium 3.4 (L) 06/04/2019 12:25 PM    Chloride 104 06/04/2019 12:25 PM    CO2 27 06/04/2019 12:25 PM    Anion gap 9 06/04/2019 12:25 PM    Glucose 122 (H) 06/04/2019 12:25 PM    BUN 17 06/04/2019 12:25 PM    Creatinine 0.98 06/04/2019 12:25 PM    BUN/Creatinine ratio 17 06/04/2019 12:25 PM    GFR est AA >60 06/04/2019 12:25 PM    GFR est non-AA 57 (L) 06/04/2019 12:25 PM    Calcium 9.7 06/04/2019 12:25 PM    Bilirubin, total 0.4 02/25/2020 11:23 AM    Alk. phosphatase 83 02/25/2020 11:23 AM    Protein, total 6.7 02/25/2020 11:23 AM    Albumin 4.4 02/25/2020 11:23 AM    Globulin 3.8 06/04/2019 12:25 PM    A-G Ratio 1.0 (L) 06/04/2019 12:25 PM    ALT (SGPT) 23 02/25/2020 11:23 AM           ASSESSMENT/RECOMMENDATIONS:.      1. HTN  - Blood pressure is good today continue current medical regimen. 2. Dyslipidemia  - Lipids are at goal on current medical regimen. They are followed by her primary care. Her LDL goal should be close to 70. Last LDL I have is 79  3. Chest discomfort  -She had a recent episode of chest discomfort that required nitroglycerin. She took it in about 30 minutes her chest discomfort resolved after only just one nitroglycerin. Sounds somewhat atypical but in light of her obesity other risk factors I think we should proceed with a Lexiscan Cardiolite and CT ANGIO  did describe soft plaque in her LAD distribution. 4. Obesity  - Encouraged her to work on healthy diet and exercising regularly. - Gave her information on a weight loss physician as well as medi weight loss. 5. Diabetes  - Last HGB A1C goal should 6.    6. ISAIAH  - Is complaint with CPAP  7. Return in 6 months or PRN. Orders Placed This Encounter    AMB POC EKG ROUTINE W/ 12 LEADS, INTER & REP     Order Specific Question:   Reason for Exam:     Answer:   HTN    allopurinoL (ZYLOPRIM) 300 mg tablet     Sig: Take 300 mg by mouth daily.  fexofenadine (ALLEGRA) 180 mg tablet     Sig: Take 180 mg by mouth daily.  dilTIAZem ER (Tiadylt ER) 300 mg capsule     Sig: Take 300 mg by mouth daily. Follow-up and Dispositions  ·   Return in about 6 months (around 1/21/2021). I have discussed the diagnosis with  Juliano Blount and the intended plan as seen in the above orders. Questions were answered concerning future plans. I have discussed medication side effects and warnings with the patient as well. Thank you,  Maritza Ambriz MD for involving me in the care of  Juliano Blount. Please do not hesitate to contact me for further questions/concerns. Eduardo Ching MD, [facility] [place]    Patient Care Team:  Maritza Ambriz MD as PCP - General (Family Medicine)  Maritza Ambriz MD as PCP - [facility]    [address], [address] [place] [address]     [address]      [phone] / [phone] Fax

## 2020-07-22 DIAGNOSIS — E78.5 DYSLIPIDEMIA: Primary | ICD-10-CM

## 2020-08-11 ENCOUNTER — OFFICE VISIT (OUTPATIENT)
Dept: URGENT CARE | Age: 70
End: 2020-08-11
Payer: MEDICARE

## 2020-08-11 VITALS — HEART RATE: 65 BPM | TEMPERATURE: 98.6 F | RESPIRATION RATE: 18 BRPM | OXYGEN SATURATION: 97 %

## 2020-08-11 DIAGNOSIS — Z20.822 ENCOUNTER FOR LABORATORY TESTING FOR COVID-19 VIRUS: Primary | ICD-10-CM

## 2020-08-11 PROCEDURE — 99211 OFF/OP EST MAY X REQ PHY/QHP: CPT | Performed by: NURSE PRACTITIONER

## 2020-08-13 LAB — SARS-COV-2, NAA: NOT DETECTED

## 2020-08-17 ENCOUNTER — ANCILLARY PROCEDURE (OUTPATIENT)
Dept: CARDIOLOGY CLINIC | Age: 70
End: 2020-08-17
Payer: MEDICARE

## 2020-08-17 VITALS — BODY MASS INDEX: 44.12 KG/M2 | WEIGHT: 249 LBS | HEIGHT: 63 IN

## 2020-08-17 DIAGNOSIS — G47.33 OSA (OBSTRUCTIVE SLEEP APNEA): ICD-10-CM

## 2020-08-17 DIAGNOSIS — E78.5 DYSLIPIDEMIA: ICD-10-CM

## 2020-08-17 DIAGNOSIS — E11.9 CONTROLLED TYPE 2 DIABETES MELLITUS WITHOUT COMPLICATION, WITHOUT LONG-TERM CURRENT USE OF INSULIN (HCC): ICD-10-CM

## 2020-08-17 DIAGNOSIS — I20.9 ANGINA PECTORIS (HCC): ICD-10-CM

## 2020-08-17 DIAGNOSIS — I10 ESSENTIAL HYPERTENSION: ICD-10-CM

## 2020-08-17 PROCEDURE — 93016 CV STRESS TEST SUPVJ ONLY: CPT | Performed by: INTERNAL MEDICINE

## 2020-08-17 PROCEDURE — A9500 TC99M SESTAMIBI: HCPCS

## 2020-08-17 PROCEDURE — 78452 HT MUSCLE IMAGE SPECT MULT: CPT | Performed by: INTERNAL MEDICINE

## 2020-08-17 PROCEDURE — 93018 CV STRESS TEST I&R ONLY: CPT | Performed by: INTERNAL MEDICINE

## 2020-08-17 RX ORDER — TETRAKIS(2-METHOXYISOBUTYLISOCYANIDE)COPPER(I) TETRAFLUOROBORATE 1 MG/ML
25.1 INJECTION, POWDER, LYOPHILIZED, FOR SOLUTION INTRAVENOUS ONCE
Status: COMPLETED | OUTPATIENT
Start: 2020-08-17 | End: 2020-08-17

## 2020-08-17 RX ADMIN — REGADENOSON 0.4 MG: 0.08 INJECTION, SOLUTION INTRAVENOUS at 13:31

## 2020-08-17 RX ADMIN — TETRAKIS(2-METHOXYISOBUTYLISOCYANIDE)COPPER(I) TETRAFLUOROBORATE 25.1 MILLICURIE: 1 INJECTION, POWDER, LYOPHILIZED, FOR SOLUTION INTRAVENOUS at 13:30

## 2020-08-18 ENCOUNTER — APPOINTMENT (OUTPATIENT)
Dept: CARDIOLOGY CLINIC | Age: 70
End: 2020-08-18

## 2020-08-18 RX ORDER — TETRAKIS(2-METHOXYISOBUTYLISOCYANIDE)COPPER(I) TETRAFLUOROBORATE 1 MG/ML
25.3 INJECTION, POWDER, LYOPHILIZED, FOR SOLUTION INTRAVENOUS ONCE
Status: COMPLETED | OUTPATIENT
Start: 2020-08-18 | End: 2020-08-18

## 2020-08-18 RX ADMIN — TECHNETIUM TC 99M SESTAMIBI 25.3 MILLICURIE: 1 INJECTION INTRAVENOUS at 16:00

## 2020-08-20 LAB
STRESS BASELINE DIAS BP: 80 MMHG
STRESS BASELINE HR: 64 BPM
STRESS BASELINE SYS BP: 132 MMHG
STRESS O2 SAT PEAK: 98 %
STRESS O2 SAT REST: 98 %
STRESS PEAK DIAS BP: 70 MMHG
STRESS PEAK SYS BP: 142 MMHG
STRESS PERCENT HR ACHIEVED: 53 %
STRESS POST PEAK HR: 81 BPM
STRESS RATE PRESSURE PRODUCT: NORMAL BPM*MMHG
STRESS ST DEPRESSION: 0 MM
STRESS ST ELEVATION: 0 MM
STRESS TARGET HR: 152 BPM

## 2020-09-22 ENCOUNTER — TELEPHONE (OUTPATIENT)
Dept: CARDIOLOGY CLINIC | Age: 70
End: 2020-09-22

## 2020-09-22 DIAGNOSIS — I20.9 ANGINA PECTORIS (HCC): ICD-10-CM

## 2020-09-22 DIAGNOSIS — E11.9 CONTROLLED TYPE 2 DIABETES MELLITUS WITHOUT COMPLICATION, WITHOUT LONG-TERM CURRENT USE OF INSULIN (HCC): ICD-10-CM

## 2020-09-22 DIAGNOSIS — I10 ESSENTIAL HYPERTENSION: ICD-10-CM

## 2020-09-22 DIAGNOSIS — R07.9 CHEST PAIN, UNSPECIFIED TYPE: ICD-10-CM

## 2020-09-22 DIAGNOSIS — E78.00 ELEVATED CHOLESTEROL: ICD-10-CM

## 2020-09-22 DIAGNOSIS — Z01.818 PREOP TESTING: Primary | ICD-10-CM

## 2020-09-22 DIAGNOSIS — E78.5 DYSLIPIDEMIA: ICD-10-CM

## 2020-09-22 DIAGNOSIS — G47.33 OSA (OBSTRUCTIVE SLEEP APNEA): ICD-10-CM

## 2020-09-22 DIAGNOSIS — R07.89 CHEST DISCOMFORT: ICD-10-CM

## 2020-09-22 DIAGNOSIS — E66.01 OBESITY, MORBID (HCC): ICD-10-CM

## 2020-09-22 NOTE — TELEPHONE ENCOUNTER
Pt scheduled for L heart cath 10/7/20 arrival at 7:30 am 2nd floor of [facility]. NPO after MN - may take pills with water-hold Metformin day prior and day of. Need ride home. Get labs done this coming week.  - faxed lab slip to [address]

## 2020-09-26 LAB
BUN SERPL-MCNC: 15 MG/DL (ref 8–27)
BUN/CREAT SERPL: 19 (ref 12–28)
CALCIUM SERPL-MCNC: 10.2 MG/DL (ref 8.7–10.3)
CHLORIDE SERPL-SCNC: 101 MMOL/L (ref 96–106)
CO2 SERPL-SCNC: 25 MMOL/L (ref 20–29)
CREAT SERPL-MCNC: 0.78 MG/DL (ref 0.57–1)
ERYTHROCYTE [DISTWIDTH] IN BLOOD BY AUTOMATED COUNT: 13.1 % (ref 11.7–15.4)
GLUCOSE SERPL-MCNC: 91 MG/DL (ref 65–99)
HCT VFR BLD AUTO: 42.4 % (ref 34–46.6)
HGB BLD-MCNC: 14.5 G/DL (ref 11.1–15.9)
MAGNESIUM SERPL-MCNC: 2 MG/DL (ref 1.6–2.3)
MCH RBC QN AUTO: 31.3 PG (ref 26.6–33)
MCHC RBC AUTO-ENTMCNC: 34.2 G/DL (ref 31.5–35.7)
MCV RBC AUTO: 91 FL (ref 79–97)
PLATELET # BLD AUTO: 296 X10E3/UL (ref 150–450)
POTASSIUM SERPL-SCNC: 4 MMOL/L (ref 3.5–5.2)
RBC # BLD AUTO: 4.64 X10E6/UL (ref 3.77–5.28)
SODIUM SERPL-SCNC: 141 MMOL/L (ref 134–144)
WBC # BLD AUTO: 10.6 X10E3/UL (ref 3.4–10.8)

## 2020-09-28 NOTE — TELEPHONE ENCOUNTER
Your potassium and kidney function and magnesium are normal and this is great news. Your blood counts look good as well. I hope all is well, stay healthy.

## 2020-10-05 ENCOUNTER — TELEPHONE (OUTPATIENT)
Dept: CARDIOLOGY CLINIC | Age: 70
End: 2020-10-05

## 2020-10-05 NOTE — TELEPHONE ENCOUNTER
Patient is calling to see if she should hold her aspirin before her procedure. Please advise.       Phone #: [phone]  Thanks

## 2020-10-06 NOTE — PROGRESS NOTES
Spoke with patient to verify arrival time 0730, to remain NPO after midnight, and  for transportation home. Patient verbalized understanding.      COVID screening completed

## 2020-10-07 ENCOUNTER — HOSPITAL ENCOUNTER (OUTPATIENT)
Age: 70
Setting detail: OUTPATIENT SURGERY
Discharge: HOME | End: 2020-10-07
Attending: SPECIALIST | Admitting: SPECIALIST
Payer: MEDICARE

## 2020-10-07 VITALS
BODY MASS INDEX: 43.66 KG/M2 | HEIGHT: 63 IN | RESPIRATION RATE: 16 BRPM | DIASTOLIC BLOOD PRESSURE: 94 MMHG | TEMPERATURE: 98 F | HEART RATE: 61 BPM | WEIGHT: 246.4 LBS | SYSTOLIC BLOOD PRESSURE: 142 MMHG | OXYGEN SATURATION: 99 %

## 2020-10-07 DIAGNOSIS — B00.9 HSV-2 (HERPES SIMPLEX VIRUS 2) INFECTION: ICD-10-CM

## 2020-10-07 DIAGNOSIS — E78.00 ELEVATED CHOLESTEROL: ICD-10-CM

## 2020-10-07 DIAGNOSIS — I10 ESSENTIAL HYPERTENSION: ICD-10-CM

## 2020-10-07 DIAGNOSIS — R94.39 ABNORMAL BALLISTOCARDIOGRAM: ICD-10-CM

## 2020-10-07 LAB
GLUCOSE BLD STRIP.AUTO-MCNC: 118 MG/DL (ref 65–100)
SERVICE CMNT-IMP: ABNORMAL

## 2020-10-07 PROCEDURE — 77030003623 HC NDL PERC VASC TELE -C: Performed by: SPECIALIST

## 2020-10-07 PROCEDURE — 99152 MOD SED SAME PHYS/QHP 5/>YRS: CPT | Performed by: SPECIALIST

## 2020-10-07 PROCEDURE — 99153 MOD SED SAME PHYS/QHP EA: CPT | Performed by: SPECIALIST

## 2020-10-07 PROCEDURE — 93458 L HRT ARTERY/VENTRICLE ANGIO: CPT | Performed by: SPECIALIST

## 2020-10-07 PROCEDURE — 82962 GLUCOSE BLOOD TEST: CPT

## 2020-10-07 PROCEDURE — 74011000250 HC RX REV CODE- 250: Performed by: SPECIALIST

## 2020-10-07 PROCEDURE — 77030029065 HC DRSG HEMO QCLOT ZMED -B: Performed by: SPECIALIST

## 2020-10-07 PROCEDURE — 77030004532 HC CATH ANGI DX IMP BSC -A: Performed by: SPECIALIST

## 2020-10-07 PROCEDURE — C1894 INTRO/SHEATH, NON-LASER: HCPCS | Performed by: SPECIALIST

## 2020-10-07 PROCEDURE — C1769 GUIDE WIRE: HCPCS | Performed by: SPECIALIST

## 2020-10-07 PROCEDURE — 74011000636 HC RX REV CODE- 636: Performed by: SPECIALIST

## 2020-10-07 PROCEDURE — 74011250636 HC RX REV CODE- 250/636: Performed by: SPECIALIST

## 2020-10-07 PROCEDURE — C1760 CLOSURE DEV, VASC: HCPCS | Performed by: SPECIALIST

## 2020-10-07 RX ORDER — LIDOCAINE HYDROCHLORIDE 10 MG/ML
INJECTION INFILTRATION; PERINEURAL AS NEEDED
Status: DISCONTINUED | OUTPATIENT
Start: 2020-10-07 | End: 2020-10-07 | Stop reason: HOSPADM

## 2020-10-07 RX ORDER — SODIUM CHLORIDE 9 MG/ML
INJECTION, SOLUTION INTRAVENOUS
Status: COMPLETED | OUTPATIENT
Start: 2020-10-07 | End: 2020-10-07

## 2020-10-07 RX ORDER — SODIUM CHLORIDE 0.9 % (FLUSH) 0.9 %
5-40 SYRINGE (ML) INJECTION EVERY 8 HOURS
Status: DISCONTINUED | OUTPATIENT
Start: 2020-10-07 | End: 2020-10-07 | Stop reason: HOSPADM

## 2020-10-07 RX ORDER — METFORMIN HYDROCHLORIDE 500 MG/1
500 TABLET ORAL
Qty: 1 TAB | Refills: 0 | Status: SHIPPED | OUTPATIENT
Start: 2020-10-10

## 2020-10-07 RX ORDER — FENTANYL CITRATE 50 UG/ML
INJECTION, SOLUTION INTRAMUSCULAR; INTRAVENOUS AS NEEDED
Status: DISCONTINUED | OUTPATIENT
Start: 2020-10-07 | End: 2020-10-07 | Stop reason: HOSPADM

## 2020-10-07 RX ORDER — MIDAZOLAM HYDROCHLORIDE 1 MG/ML
INJECTION, SOLUTION INTRAMUSCULAR; INTRAVENOUS AS NEEDED
Status: DISCONTINUED | OUTPATIENT
Start: 2020-10-07 | End: 2020-10-07 | Stop reason: HOSPADM

## 2020-10-07 RX ORDER — SODIUM CHLORIDE 0.9 % (FLUSH) 0.9 %
5-40 SYRINGE (ML) INJECTION AS NEEDED
Status: DISCONTINUED | OUTPATIENT
Start: 2020-10-07 | End: 2020-10-07 | Stop reason: HOSPADM

## 2020-10-07 RX ORDER — SODIUM CHLORIDE 9 MG/ML
1000 INJECTION, SOLUTION INTRAVENOUS CONTINUOUS
Status: DISCONTINUED | OUTPATIENT
Start: 2020-10-07 | End: 2020-10-07 | Stop reason: HOSPADM

## 2020-10-07 RX ORDER — SODIUM CHLORIDE 9 MG/ML
75 INJECTION, SOLUTION INTRAVENOUS CONTINUOUS
Status: DISPENSED | OUTPATIENT
Start: 2020-10-07 | End: 2020-10-07

## 2020-10-07 NOTE — Clinical Note
TRANSFER - OUT REPORT:     Verbal report given to: Parish Jones Dr. Report consisted of patient's Situation, Background, Assessment and   Recommendations(SBAR). Opportunity for questions and clarification was provided. Patient transported with a Registered Nurse. Patient transported to: [facility].

## 2020-10-07 NOTE — Clinical Note
TRANSFER - IN REPORT:     Verbal report received from: Parish Jones Dr. Report consisted of patient's Situation, Background, Assessment and   Recommendations(SBAR). Opportunity for questions and clarification was provided. Assessment completed upon patient's arrival to unit and care assumed. Patient transported with a Registered Nurse.

## 2020-10-07 NOTE — PROGRESS NOTES
7:56 AM  Patient arrived. ID and allergies verified verbally with patient. Pt voices understanding of procedure to be performed. Consent obtained. Pt prepped for procedure. 8:52 AM  TRANSFER - OUT REPORT:    Verbal report given to JONI RN(name) on Cayla Quintana  being transferred to CATH LAB(unit) for ordered procedure       Report consisted of patients Situation, Background, Assessment and   Recommendations(SBAR). Information from the following report(s) SBAR was reviewed with the receiving nurse. Lines:   Peripheral IV 10/07/20 Right Antecubital (Active)   Site Assessment Clean, dry, & intact 10/07/20 0821   Phlebitis Assessment 0 10/07/20 0821   Infiltration Assessment 0 10/07/20 0821   Dressing Status Clean, dry, & intact 10/07/20 0821   Dressing Type Transparent 10/07/20 7774        Opportunity for questions and clarification was provided. Patient transported with:   Registered Nurse      9:50 AM  TRANSFER - IN REPORT:    Verbal report received from KELLI MAGALLON  RN(name) on Cayla Quintana  being received from CATH LAB(unit) for routine post - op      Report consisted of patients Situation, Background, Assessment and   Recommendations(SBAR). Information from the following report(s) Procedure Summary was reviewed with the receiving nurse. Opportunity for questions and clarification was provided. Assessment completed upon patients arrival to unit and care assumed. 11:30 AM  Discharge instructions reviewed with patient and family. Voiced understanding. Patient given copy of discharge instructions to take home. 11:55 AM  Pt sat on side of bed then ambulated around unit and to restroom. Voided. Returned to chair. Cath site dressing dry and intact. No bleeding or hematoma. Pt voices no complaints. 12:20 AM  Pt discharged via wheelchair with family. Personal belongings with patient upon discharge.

## 2020-10-07 NOTE — ROUTINE PROCESS
Spoke with Dr Caryn Palomo regarding pts allergy to pork/porcine products. Plan for radial cath today would require heparin products. Spoke with Pharmacy and per Arpita Valdivia, no heparin or lovenox for pt with stated allergy. Angiomax could be used as substitution. Per Dr Caryn Palomo, will use femoral artery for approach instead of radial artery. No heparinized saline to be used during procedure. Cath team updated on plan.

## 2020-10-07 NOTE — DISCHARGE INSTRUCTIONS
Cardiac Catheterization/Angiography Discharge Instructions    *Check the puncture site frequently for swelling or bleeding. If you see any bleeding, lie down and apply pressure over the area and call 911. Notify your doctor for any redness, swelling, drainage or oozing from the puncture site. Notify your doctor for any fever or chills. *If the leg  with the puncture becomes cold, numb or painful, call Dr Cortez Velázquez  at  [phone]    *Activity should be limited for the next 48 hours. Climb stairs as little as possible and avoid any stooping, bending or strenuous activity for 48 hours. No heavy lifting (anything over 10 pounds) for three days. *Do not drive for 24 hours. *You may resume your usual diet. Drink more fluids than usual.    *Have a responsible person drive you home and stay with you for at least 24 hours after your heart catheterization/angiography. *You may remove the bandage from your groin in 24 hours. You may shower in 24 hours. No tub baths, hot tubs or swimming for one week. Do not place any lotions, creams, powders, ointments over the puncture site for one week. You may place a clean band-aid over the puncture site each day for 5 days. Change this daily. Patient Discharge Instructions    William Morse / [id number] : 1950    Admitted 10/7/2020 Discharged: 10/7/2020     Take Home Medications            · It is important that you take the medication exactly as they are prescribed. · Keep your medication in the bottles provided by the pharmacist and keep a list of the medication names, dosages, and times to be taken in your wallet. · Do not take other medications without consulting your doctor.        What to do at Home    Recommended diet: Low fat, Low cholesterol    Recommended activity: Activity as tolerated and no driving for today        Follow-up with Darlin Roldan MD  in 6 week  BRING ALL of [facility] [address] VISIT with Dr. Jay Butterfield TEST :    1) No coronary  artery disease  2) begin metformin on Saturday the 10 th of Oct.    Call 911 anytime you think you may need emergency care. For example, call if:  You have signs of a heart attack. These may include:  Chest pain or pressure. Sweating. Shortness of breath. Nausea or vomiting. Pain that spreads from the chest to the neck, jaw, or one or both shoulders or arms. Dizziness or lightheadedness. A fast or irregular pulse. After calling 911, chew 1 adult-strength aspirin. Wait for an ambulance. Do not try to drive yourself. You passed out (lost consciousness). You feel like you are having another heart attack. Call your doctor now or seek immediate medical care if:  You have had any chest pain, even if it has gone away. You have new or increased shortness of breath. You are dizzy or lightheaded, or you feel like you may faint. Information obtained by :  I understand that if any problems occur once I am at home I am to contact my physician. I understand and acknowledge receipt of the instructions indicated above.

## 2020-10-07 NOTE — H&P
ATTENTION:   This medical record was transcribed using an electronic medical records/speech recognition system. Although proofread, it may and can contain electronic, spelling and other errors. Corrections may be executed at a later time. Please feel free to contact us for any clarifications as needed. Date of Surgery Update:  Jose J Michelle was seen and examined. History and physical has been reviewed. The patient has been examined. There have been no significant clinical changes since the completion of the originally dated History and Physical.    Signed By: Milo Maciel MD     October 7, 2020 7:46 AM                   ICD-10-CM ICD-9-CM   1. Abnormal ballistocardiogram  R94.39 794.39            Jose J Michelle is a 78 y.o. female with HTN, angina, asthma, dyslipidemia, and obesity referred for follow up. Cardiac risk factors: dyslipidemia, obesity, hypertension, post-menopausal  I have personally obtained the history from the patient. HISTORY OF PRESENTING ILLNESS     Overall the pt states she is doing well but does state that recently she had an episode of chest discomfort for which she took nitroglycerin. She thinks she was lying down or possibly and her  got her upset at the time. She has not had any further discomfort or any other associated symptoms. [place]            ACTIVE PROBLEM LIST     Patient Active Problem List    Diagnosis Date Noted    Obesity, morbid ([address] 08/06/2019    HTN (hypertension) 07/06/2011    Angina pectoris ([facility] [address] 07/06/2011    Asthma 07/06/2011    Obesity 07/06/2011    Elevated cholesterol 07/06/2011    HSV-2 (herpes simplex virus 2) infection 07/06/2011    Osteopenia 07/06/2011           PAST MEDICAL HISTORY     Past Medical History:   Diagnosis Date    Acid indigestion     or heartburn    Arthritis     hand and feet    Asthma     Constipation     Diabetes (HCC)     GERD (gastroesophageal reflux disease)     Heart disease     High blood pressure     Low back pain, episodic     Multiple stiff joints     Nausea & vomiting     Shortness of breath     Sinus pressure     Sleep apnea     cpap @ hs    Sore throat     Stroke [facility])     TIA 2017    Trouble in sleeping     Wheezing            PAST SURGICAL HISTORY     Past Surgical History:   Procedure Laterality Date    BIOPSY BREAST      BREAST SURGERY PROCEDURE UNLISTED      l lumpectomy, l biopsy    BREAST SURGERY PROCEDURE UNLISTED      r surgery to the nipple    CARDIAC SURG PROCEDURE UNLIST      checked for blockages    COLONOSCOPY N/A 5/26/2020    COLONOSCOPY performed by Maxx Foley MD at [address] [place] [address], COLON, DIAGNOSTIC  05/09          ALLERGIES     Allergies   Allergen Reactions    Lisinopril Angioedema    Pork/Porcine Containing Products Angioedema    Pcn [Penicillins] Rash          FAMILY HISTORY     Family History   Problem Relation Age of Onset    Breast Cancer Mother     Hypertension Mother     Cancer Mother     Diabetes Father     Coronary Artery Disease Sister     Breast Cancer Sister     Cancer Sister     Hypertension Brother     Heart Attack Maternal Grandmother     Dementia Paternal Grandmother     Hypertension Brother     Cancer Brother         Pancreas & Liver ca    Cancer Maternal Aunt     Cancer Paternal Aunt     negative for cardiac disease       SOCIAL HISTORY     Social History     Socioeconomic History    Marital status:      Spouse name: Not on file    Number of children: Not on file    Years of education: Not on file    Highest education level: Not on file   Tobacco Use    Smoking status: Never Smoker    Smokeless tobacco: Never Used   Substance and Sexual Activity    Alcohol use: Yes     Comment: rare occasions    Drug use: No    Sexual activity: Yes     Partners: Male         MEDICATIONS     No current facility-administered medications for this encounter. I have reviewed the nurses notes, vitals, problem list, allergy list, medical history, family, social history and medications. REVIEW OF SYMPTOMS      General: Pt denies excessive weight gain or loss. Pt is able to conduct ADL's  HEENT: Denies blurred vision, headaches, hearing loss, epistaxis and difficulty swallowing. Respiratory: Denies cough, congestion, shortness of breath, BERMUDEZ, wheezing or stridor. Cardiovascular: Denies precordial pain, palpitations, edema or PND  Gastrointestinal: Denies poor appetite, indigestion, abdominal pain or blood in stool  Genitourinary: Denies hematuria, dysuria, increased urinary frequency  Musculoskeletal: Denies joint pain or swelling from muscles or joints  Neurologic: Denies tremor, paresthesias, headache, or sensory motor disturbance  Psychiatric: Denies confusion, insomnia, depression  Integumentray: Denies rash, itching or ulcers. Hematologic: Denies easy bruising, bleeding     PHYSICAL EXAMINATION      There were no vitals filed for this visit. General: Well developed, in no acute distress. HEENT: No jaundice, oral mucosa moist, no oral ulcers  Neck: Supple, no stiffness, no lymphadenopathy, supple  Heart:  Normal S1/S2 negative S3 or S4. Regular, no murmur, gallop or rub, no jugular venous distention  Respiratory: Clear bilaterally x 4, no wheezing or rales  Abdomen:   Soft, non-tender, bowel sounds are active. Extremities:  No edema, normal cap refill, no cyanosis. Musculoskeletal: No clubbing, no deformities  Neuro: A&Ox3, speech clear, gait stable, cooperative, no focal neurologic deficits  Skin: Skin color is normal. No rashes or lesions. Non diaphoretic, moist.  Vascular: 2+ pulses symmetric in all extremities           DIAGNOSTIC DATA     1. Stress Test  3/7/13- NM stress- no ischemia, EF 68%  10/22/18-Cardiolite-no ischemia, EF 75%    2. Echo  2/25/13- EF 55%, MV mild annular calcification, TR mild, PAP 35 mmHg    3.  LE Doppler  5/10/18- No evidence of right lower extremity vein thrombosis. 4. Cholesterol   9/5/18: , HDL 45, LDL 83, .  6/5/19- , HDL 42, LDL 79,     5. Coronary CT angio/Ca++ scoring  (6/24/19): 1. Left main originate normally from left coronary cusp and is normal size  without any significant atherosclerotic plaque or calcification. 2. The LAD is normal size with significant tortuosity. There is a mild soft  plaque in the mid LAD without any significant stenosis. There is no significant  calcification. The D1 and D2 diagonal branches were seen without any significant  lesion or calcification. 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 3. The left circumflex is small size vessel with significant tortuosity. There  is no significant lesion or calcification. The OM1 branch is very small without  any significant disease. 4. The RCA is normal size vessel and originate normally from the right coronary  cusp. The RCA is highly tortuous. The mid RCA demonstrate mild soft plaque  without any significant luminal stenosis. CALCIUM SCORE =0         LABORATORY DATA            Lab Results   Component Value Date/Time    WBC 10.6 09/25/2020 03:32 PM    HGB 14.5 09/25/2020 03:32 PM    HCT 42.4 09/25/2020 03:32 PM    PLATELET 477 12/04/5589 03:32 PM    MCV 91 09/25/2020 03:32 PM      Lab Results   Component Value Date/Time    Sodium 141 09/25/2020 03:32 PM    Potassium 4.0 09/25/2020 03:32 PM    Chloride 101 09/25/2020 03:32 PM    CO2 25 09/25/2020 03:32 PM    Anion gap 9 06/04/2019 12:25 PM    Glucose 91 09/25/2020 03:32 PM    BUN 15 09/25/2020 03:32 PM    Creatinine 0.78 09/25/2020 03:32 PM    BUN/Creatinine ratio 19 09/25/2020 03:32 PM    GFR est AA 90 09/25/2020 03:32 PM    GFR est non-AA 78 09/25/2020 03:32 PM    Calcium 10.2 09/25/2020 03:32 PM    Bilirubin, total 0.4 02/25/2020 11:23 AM    Alk.  phosphatase 83 02/25/2020 11:23 AM    Protein, total 6.7 02/25/2020 11:23 AM    Albumin 4.4 02/25/2020 11:23 AM    Globulin 3.8 06/04/2019 12:25 PM    A-G Ratio 1.0 (L) 06/04/2019 12:25 PM    ALT (SGPT) 23 02/25/2020 11:23 AM           ASSESSMENT/RECOMMENDATIONS:.      1. HTN  - Blood pressure is good today continue current medical regimen. 2. Dyslipidemia  - Lipids are at goal on current medical regimen. They are followed by her primary care. Her LDL goal should be close to 70. Last LDL I have is 79  3. Chest discomfort  -She had a recent episode of chest discomfort that required nitroglycerin. She took it in about 30 minutes her chest discomfort resolved after only just one nitroglycerin. Sounds somewhat atypical but in light of her obesity other risk factors I think we should proceed with a Lexiscan Cardiolite and CT ANGIO  did describe soft plaque in her LAD distribution. Abnormal stress test in the anterior apical region. Risk and benefits of cardiac catheterization reviewed with the patient . I reviewed he risks (including but not limited to death, myocardial infarction, cerebrovascular accident, dysrhythmia, renal failure, vascular complication, allergy, and/or need for emergency surgery), benefits, and alternatives have been explained. The patient wishes to proceed and is an ASA2 and [place]  Verbal consent has been obtained. Right radial approach. 4. Obesity  - Encouraged her to work on healthy diet and exercising regularly. - Gave her information on a weight loss physician as well as medi weight loss. 5. Diabetes  - Last HGB A1C goal should 6.    6. ISAIAH  - Is complaint with CPAP  7. Return in 6 months or PRN. No orders of the defined types were placed in this encounter. I have discussed the diagnosis with  Maycol Jones and the intended plan as seen in the above orders. Questions were answered concerning future plans. I have discussed medication side effects and warnings with the patient as well.     Thank you,  Ivette Mendosa MD for involving me in the care of  Lashawnmarylou Harrison. Please do not hesitate to contact me for further questions/concerns. Eduardo Campbell MD, [facility] [place]    Patient Care Team:  Yakelin Haney MD as PCP - General (Family Medicine)  Yakelin Haney MD as PCP - [facility] [address]     [address] [address]      [phone] / [phone] Fax

## 2020-11-29 ENCOUNTER — HOSPITAL ENCOUNTER (EMERGENCY)
Age: 70
Discharge: HOME | End: 2020-11-29
Attending: EMERGENCY MEDICINE
Payer: MEDICARE

## 2020-11-29 ENCOUNTER — APPOINTMENT (OUTPATIENT)
Dept: GENERAL RADIOLOGY | Age: 70
End: 2020-11-29
Attending: EMERGENCY MEDICINE
Payer: MEDICARE

## 2020-11-29 VITALS
HEART RATE: 92 BPM | DIASTOLIC BLOOD PRESSURE: 62 MMHG | RESPIRATION RATE: 18 BRPM | BODY MASS INDEX: 44.88 KG/M2 | OXYGEN SATURATION: 96 % | HEIGHT: 63 IN | TEMPERATURE: 98.7 F | SYSTOLIC BLOOD PRESSURE: 141 MMHG | WEIGHT: 253.31 LBS

## 2020-11-29 DIAGNOSIS — M62.838 MUSCLE SPASM: Primary | ICD-10-CM

## 2020-11-29 DIAGNOSIS — M62.838 TRAPEZIUS MUSCLE SPASM: ICD-10-CM

## 2020-11-29 LAB
ANION GAP SERPL CALC-SCNC: 9 MMOL/L (ref 5–15)
BASOPHILS # BLD: 0.1 K/UL (ref 0–0.1)
BASOPHILS NFR BLD: 1 % (ref 0–1)
BUN SERPL-MCNC: 15 MG/DL (ref 6–20)
BUN/CREAT SERPL: 17 (ref 12–20)
CALCIUM SERPL-MCNC: 9.7 MG/DL (ref 8.5–10.1)
CHLORIDE SERPL-SCNC: 106 MMOL/L (ref 97–108)
CO2 SERPL-SCNC: 29 MMOL/L (ref 21–32)
CREAT SERPL-MCNC: 0.89 MG/DL (ref 0.55–1.02)
DIFFERENTIAL METHOD BLD: ABNORMAL
EOSINOPHIL # BLD: 0.5 K/UL (ref 0–0.4)
EOSINOPHIL NFR BLD: 5 % (ref 0–7)
ERYTHROCYTE [DISTWIDTH] IN BLOOD BY AUTOMATED COUNT: 13.7 % (ref 11.5–14.5)
GLUCOSE SERPL-MCNC: 98 MG/DL (ref 65–100)
HCT VFR BLD AUTO: 40.9 % (ref 35–47)
HGB BLD-MCNC: 13.5 G/DL (ref 11.5–16)
IMM GRANULOCYTES # BLD AUTO: 0.1 K/UL (ref 0–0.04)
IMM GRANULOCYTES NFR BLD AUTO: 1 % (ref 0–0.5)
LYMPHOCYTES # BLD: 3.1 K/UL (ref 0.8–3.5)
LYMPHOCYTES NFR BLD: 27 % (ref 12–49)
MCH RBC QN AUTO: 30 PG (ref 26–34)
MCHC RBC AUTO-ENTMCNC: 33 G/DL (ref 30–36.5)
MCV RBC AUTO: 90.9 FL (ref 80–99)
MONOCYTES # BLD: 0.8 K/UL (ref 0–1)
MONOCYTES NFR BLD: 7 % (ref 5–13)
NEUTS SEG # BLD: 7 K/UL (ref 1.8–8)
NEUTS SEG NFR BLD: 60 % (ref 32–75)
NRBC # BLD: 0 K/UL (ref 0–0.01)
NRBC BLD-RTO: 0 PER 100 WBC
PLATELET # BLD AUTO: 293 K/UL (ref 150–400)
PMV BLD AUTO: 10.9 FL (ref 8.9–12.9)
POTASSIUM SERPL-SCNC: 3.6 MMOL/L (ref 3.5–5.1)
RBC # BLD AUTO: 4.5 M/UL (ref 3.8–5.2)
SODIUM SERPL-SCNC: 144 MMOL/L (ref 136–145)
TROPONIN I SERPL-MCNC: <0.05 NG/ML
WBC # BLD AUTO: 11.6 K/UL (ref 3.6–11)

## 2020-11-29 PROCEDURE — 80048 BASIC METABOLIC PNL TOTAL CA: CPT

## 2020-11-29 PROCEDURE — 36415 COLL VENOUS BLD VENIPUNCTURE: CPT

## 2020-11-29 PROCEDURE — 85025 COMPLETE CBC W/AUTO DIFF WBC: CPT

## 2020-11-29 PROCEDURE — 74011250637 HC RX REV CODE- 250/637: Performed by: EMERGENCY MEDICINE

## 2020-11-29 PROCEDURE — 99284 EMERGENCY DEPT VISIT MOD MDM: CPT

## 2020-11-29 PROCEDURE — 84484 ASSAY OF TROPONIN QUANT: CPT

## 2020-11-29 PROCEDURE — 71046 X-RAY EXAM CHEST 2 VIEWS: CPT

## 2020-11-29 RX ORDER — CYCLOBENZAPRINE HCL 10 MG
10 TABLET ORAL
Qty: 15 TAB | Refills: 0 | Status: SHIPPED | OUTPATIENT
Start: 2020-11-29 | End: 2021-12-14

## 2020-11-29 RX ORDER — CYCLOBENZAPRINE HCL 10 MG
10 TABLET ORAL
Status: COMPLETED | OUTPATIENT
Start: 2020-11-29 | End: 2020-11-29

## 2020-11-29 RX ORDER — LOSARTAN POTASSIUM AND HYDROCHLOROTHIAZIDE 25; 100 MG/1; MG/1
1 TABLET ORAL DAILY
COMMUNITY
End: 2021-12-14

## 2020-11-29 RX ADMIN — CYCLOBENZAPRINE 10 MG: 10 TABLET, FILM COATED ORAL at 13:49

## 2020-11-29 NOTE — ED PROVIDER NOTES
Date of Service:  11/29/2020    Patient:  Osvaldo Diamond    Chief Complaint:  Headache and Neck Pain       HPI:  Osvaldo Diamond is a 78 y.o.  female who presents for evaluation of right-sided neck shoulder and some chest discomfort. Patient states for about a week she has been having increasingly worsening discomfort. She describes the area in the right lateral neck and right shoulder as well into the right pectoralis region. Seems to be getting's slowly worse but only about a 5 out of 10. Nothing she specifically does makes it better or worse. She denies any true chest pain shortness of breath abdominal pain nausea vomiting diarrhea headaches fevers chills. She denies any type of trauma. Otherwise she denies other acute complaints. She had a recent cath secondary to some chest pain but states that she did not have any type of symptoms like this with her chest pain.   Otherwise no other acute complaints           Past Medical History:   Diagnosis Date    Acid indigestion     or heartburn    Arthritis     hand and feet    Asthma     Constipation     Diabetes (HCC)     GERD (gastroesophageal reflux disease)     Heart disease     High blood pressure     Low back pain, episodic     Multiple stiff joints     Nausea & vomiting     Shortness of breath     Sinus pressure     Sleep apnea     cpap @ hs    Sore throat     Stroke [facility])     TIA 2017    Trouble in sleeping     Wheezing        Past Surgical History:   Procedure Laterality Date    BIOPSY BREAST      BREAST SURGERY PROCEDURE UNLISTED      l lumpectomy, l biopsy    BREAST SURGERY PROCEDURE UNLISTED      r surgery to the nipple    CARDIAC SURG PROCEDURE UNLIST      checked for blockages    COLONOSCOPY N/A 5/26/2020    COLONOSCOPY performed by Citlalli Zamora MD at [address] [place] [address] Zamora, COLON, DIAGNOSTIC  05/09         Family History:   Problem Relation Age of Onset   Caridad Piles Breast Cancer Mother     Hypertension Mother     Cancer Mother     Diabetes Father     Coronary Artery Disease Sister     Breast Cancer Sister     Cancer Sister     Hypertension Brother     Heart Attack Maternal Grandmother     Dementia Paternal Grandmother     Hypertension Brother     Cancer Brother         Pancreas & Liver ca    Cancer Maternal Aunt     Cancer Paternal Aunt        Social History     Socioeconomic History    Marital status:      Spouse name: Not on file    Number of children: Not on file    Years of education: Not on file    Highest education level: Not on file   Occupational History    Not on file   Social Needs    Financial resource strain: Not on file    Food insecurity     Worry: Not on file     Inability: Not on file   [employer] needs     Medical: Not on file     Non-medical: Not on file   Tobacco Use    Smoking status: Never Smoker    Smokeless tobacco: Never Used   Substance and Sexual Activity    Alcohol use: Yes     Comment: rare occasions    Drug use: No    Sexual activity: Yes     Partners: Male   Lifestyle    Physical activity     Days per week: Not on file     Minutes per session: Not on file    Stress: Not on file   Relationships    Social connections     Talks on phone: Not on file     Gets together: Not on file     Attends [employer] service: Not on file     Active member of club or organization: Not on file     Attends meetings of clubs or organizations: Not on file     Relationship status: Not on file    Intimate partner violence     Fear of current or ex partner: Not on file     Emotionally abused: Not on file     Physically abused: Not on file     Forced sexual activity: Not on file   Other Topics Concern    Not on file   Social History Narrative    Not on file         ALLERGIES: Lisinopril; Pork/porcine containing products; and Pcn [penicillins]    Review of Systems   Constitutional: Negative for fever. HENT: Negative for hearing loss. Eyes: Negative for visual disturbance. Respiratory: Negative for shortness of breath. Cardiovascular: Negative for chest pain. Gastrointestinal: Negative for abdominal pain. Genitourinary: Negative for flank pain. Musculoskeletal: Positive for neck pain. Negative for back pain and neck stiffness. Skin: Negative for rash. Neurological: Negative for dizziness, weakness, light-headedness, numbness and headaches. Psychiatric/Behavioral: Negative for confusion. Vitals:    11/29/20 1327   BP: (!) 143/76   Pulse: 92   Resp: 18   Temp: 98.7 °F (37.1 °C)   SpO2: 99%   Weight: 114.9 kg (253 lb 4.9 oz)   Height: 5' 3\" (1.6 m)            Physical Exam  Constitutional:       General: She is not in acute distress. Appearance: Normal appearance. She is not ill-appearing, toxic-appearing or diaphoretic. HENT:      Head: Normocephalic and atraumatic. Nose: Nose normal.      Mouth/Throat:      Mouth: Mucous membranes are moist.   Eyes:      General: No scleral icterus. Right eye: No discharge. Left eye: No discharge. Neck:      Musculoskeletal: Normal range of motion. Muscular tenderness present. No neck rigidity. Comments: Right lateral neck tender, full  No bony tenderness  Cardiovascular:      Rate and Rhythm: Normal rate. Pulses: Normal pulses. Heart sounds: No murmur. Pulmonary:      Effort: Pulmonary effort is normal. No respiratory distress. Abdominal:      General: Abdomen is flat. There is no distension. Musculoskeletal: Normal range of motion. General: Tenderness present. No deformity. Comments: Chest wall and shoulder pain on right   Skin:     General: Skin is warm. Capillary Refill: Capillary refill takes less than 2 seconds. Findings: No rash. Neurological:      Mental Status: She is alert and oriented to person, place, and time. Cranial Nerves: No cranial nerve deficit. Sensory: No sensory deficit. Motor: No weakness.       Gait: Gait normal. Psychiatric:         Mood and Affect: Mood normal.          [facility]      VITAL SIGNS:  Patient Vitals for the past 4 hrs:   BP SpO2   11/29/20 1445 (!) 141/62 96 %         LABS:  Recent Results (from the past 6 hour(s))   CBC WITH AUTOMATED DIFF    Collection Time: 11/29/20  1:56 PM   Result Value Ref Range    WBC 11.6 (H) 3.6 - 11.0 K/uL    RBC 4.50 3.80 - 5.20 M/uL    HGB 13.5 11.5 - 16.0 g/dL    HCT 40.9 35.0 - 47.0 %    MCV 90.9 80.0 - 99.0 FL    MCH 30.0 26.0 - 34.0 PG    MCHC 33.0 30.0 - 36.5 g/dL    RDW 13.7 11.5 - 14.5 %    PLATELET 336 180 - 962 K/uL    MPV 10.9 8.9 - 12.9 FL    NRBC 0.0 0.0  WBC    ABSOLUTE NRBC 0.00 0.00 - 0.01 K/uL    NEUTROPHILS 60 32 - 75 %    LYMPHOCYTES 27 12 - 49 %    MONOCYTES 7 5 - 13 %    EOSINOPHILS 5 0 - 7 %    BASOPHILS 1 0 - 1 %    IMMATURE GRANULOCYTES 1 (H) 0 - 0.5 %    ABS. NEUTROPHILS 7.0 1.8 - 8.0 K/UL    ABS. LYMPHOCYTES 3.1 0.8 - 3.5 K/UL    ABS. MONOCYTES 0.8 0.0 - 1.0 K/UL    ABS. EOSINOPHILS 0.5 (H) 0.0 - 0.4 K/UL    ABS. BASOPHILS 0.1 0.0 - 0.1 K/UL    ABS. IMM. GRANS. 0.1 (H) 0.00 - 0.04 K/UL    DF AUTOMATED     METABOLIC PANEL, BASIC    Collection Time: 11/29/20  1:56 PM   Result Value Ref Range    Sodium 144 136 - 145 mmol/L    Potassium 3.6 3.5 - 5.1 mmol/L    Chloride 106 97 - 108 mmol/L    CO2 29 21 - 32 mmol/L    Anion gap 9 5 - 15 mmol/L    Glucose 98 65 - 100 mg/dL    BUN 15 6 - 20 MG/DL    Creatinine 0.89 0.55 - 1.02 MG/DL    BUN/Creatinine ratio 17 12 - 20      GFR est AA >60 >60 ml/min/1.73m2    GFR est non-AA >60 >60 ml/min/1.73m2    Calcium 9.7 8.5 - 10.1 MG/DL   TROPONIN I    Collection Time: 11/29/20  1:56 PM   Result Value Ref Range    Troponin-I, Qt. <0.05 <0.05 ng/mL        IMAGING:  XR CHEST PA LAT   Final Result   IMPRESSION: No acute findings.             Medications During Visit:  Medications   cyclobenzaprine (FLEXERIL) tablet 10 mg (10 mg Oral Given 11/29/20 6457)         DECISION MAKING:  Vincent Rhodes is a 78 y.o. female who comes in as above. Diagnostics as above. Medicines provided and on repeat exam she feels remarkably better. At this time is most likely musculoskeletal given the resolution after muscle relaxers. Medications as below, PCP follow-up. Patient agreeable to this plan. All questions answered. IMPRESSION:  1. Muscle spasm    2. Trapezius muscle spasm        DISPOSITION:  Discharged      Discharge Medication List as of 11/29/2020  2:55 PM      START taking these medications    Details   !! cyclobenzaprine (FLEXERIL) 10 mg tablet Take 1 Tab by mouth three (3) times daily as needed for Muscle Spasm(s). , Normal, Disp-15 Tab,R-0       !! - Potential duplicate medications found. Please discuss with provider. CONTINUE these medications which have NOT CHANGED    Details   losartan-hydroCHLOROthiazide (HYZAAR) 100-25 mg per tablet Take 1 Tab by mouth daily. , Historical Med      metFORMIN (GLUCOPHAGE) 500 mg tablet Take 1 Tab by mouth daily (with breakfast). , Normal, Disp-1 Tab,R-0      allopurinoL (ZYLOPRIM) 300 mg tablet Take 300 mg by mouth daily. , Historical Med      fexofenadine (ALLEGRA) 180 mg tablet Take 180 mg by mouth daily. , Historical Med      dilTIAZem ER (Tiadylt ER) 300 mg capsule Take 300 mg by mouth daily. , Historical Med      QUEtiapine (SEROquel) 25 mg tablet Take 25 mg by mouth nightly., Historical Med      multivitamin (ONE A DAY) tablet Take 1 Tab by mouth daily. , Historical Med      ascorbic acid, vitamin C, (VITAMIN C) 250 mg tablet Take 250 mg by mouth., Historical Med      Biotin 2,500 mcg cap Take  by mouth., Historical Med      omega 3-dha-epa-fish oil 100-160-1,000 mg cap Take  by mouth., Historical Med      carvedilol (COREG) 25 mg tablet Take 25 mg by mouth two (2) times daily (with meals). , Historical Med      omeprazole (PRILOSEC) 20 mg capsule Take 20 mg by mouth daily. , Historical Med      simvastatin (ZOCOR) 10 mg tablet Take 20 mg by mouth nightly., Historical Med      potassium chloride SR (KLOR-CON 10) 10 mEq tablet Take 10 mEq by mouth two (2) times a day., Historical Med      Cholecalciferol, Vitamin D3, (VITAMIN D) 1,000 unit Cap Take 2,000 Units by mouth., Historical Med      aspirin 81 mg tablet Take 81 mg by mouth. Historical Med, 81 mg      LOSARTAN PO Take 100 mg by mouth daily. , Historical Med      hydroCHLOROthiazide (HYDRODIURIL) 25 mg tablet Take 25 mg by mouth daily. , Historical Med      nitroglycerin (NITROSTAT) 0.4 mg SL tablet 1 Tab by SubLINGual route every five (5) minutes as needed for Chest Pain., Normal, Disp-1 Bottle, R-5      !! cyclobenzaprine (FLEXERIL) 10 mg tablet Take  by mouth three (3) times daily as needed for Muscle Spasm(s). , Historical Med       !! - Potential duplicate medications found. Please discuss with provider. Follow-up Information     Follow up With Specialties Details Why Contact Info    Miguel Fuller MD Family Medicine Schedule an appointment as soon as possible for a visit   [address] [place] [address]  [phone]              The patient is asked to follow-up with their primary care provider in the next several days. They are to call tomorrow for an appointment. The patient is asked to return promptly for any increased concerns or worsening of symptoms. They can return to this emergency department or any other emergency department.       Procedures

## 2020-11-29 NOTE — ED NOTES
Pt was discharged and given instructions by Dr Lupe Reyes . Pt verbalized good understanding of all discharge instructions,prescriptions and F/U care. All questions answered. Pt in stable condition on discharge.

## 2020-11-29 NOTE — ED TRIAGE NOTES
Pt presents to ED with c/o one week hx of head and right side of neck. Pt denies hx of trauma or other symptoms. Pt states the pain goes down into her breast. Pt denies nausea, vomiting or dyspnea.

## 2021-02-02 ENCOUNTER — OFFICE VISIT (OUTPATIENT)
Dept: CARDIOLOGY CLINIC | Age: 71
End: 2021-02-02
Payer: MEDICARE

## 2021-02-02 VITALS
WEIGHT: 252.2 LBS | HEIGHT: 63 IN | BODY MASS INDEX: 44.69 KG/M2 | HEART RATE: 68 BPM | OXYGEN SATURATION: 96 % | SYSTOLIC BLOOD PRESSURE: 120 MMHG | DIASTOLIC BLOOD PRESSURE: 80 MMHG

## 2021-02-02 DIAGNOSIS — I10 ESSENTIAL HYPERTENSION: ICD-10-CM

## 2021-02-02 DIAGNOSIS — E11.9 CONTROLLED TYPE 2 DIABETES MELLITUS WITHOUT COMPLICATION, WITHOUT LONG-TERM CURRENT USE OF INSULIN (HCC): ICD-10-CM

## 2021-02-02 DIAGNOSIS — G47.33 OSA (OBSTRUCTIVE SLEEP APNEA): ICD-10-CM

## 2021-02-02 DIAGNOSIS — E78.00 ELEVATED CHOLESTEROL: Primary | ICD-10-CM

## 2021-02-02 DIAGNOSIS — R07.89 CHEST DISCOMFORT: ICD-10-CM

## 2021-02-02 PROCEDURE — 3046F HEMOGLOBIN A1C LEVEL >9.0%: CPT | Performed by: SPECIALIST

## 2021-02-02 PROCEDURE — 1090F PRES/ABSN URINE INCON ASSESS: CPT | Performed by: SPECIALIST

## 2021-02-02 PROCEDURE — 99214 OFFICE O/P EST MOD 30 MIN: CPT | Performed by: SPECIALIST

## 2021-02-02 PROCEDURE — 1101F PT FALLS ASSESS-DOCD LE1/YR: CPT | Performed by: SPECIALIST

## 2021-02-02 PROCEDURE — G8752 SYS BP LESS 140: HCPCS | Performed by: SPECIALIST

## 2021-02-02 PROCEDURE — 3017F COLORECTAL CA SCREEN DOC REV: CPT | Performed by: SPECIALIST

## 2021-02-02 PROCEDURE — G8754 DIAS BP LESS 90: HCPCS | Performed by: SPECIALIST

## 2021-02-02 PROCEDURE — G8399 PT W/DXA RESULTS DOCUMENT: HCPCS | Performed by: SPECIALIST

## 2021-02-02 PROCEDURE — 2022F DILAT RTA XM EVC RTNOPTHY: CPT | Performed by: SPECIALIST

## 2021-02-02 PROCEDURE — G8536 NO DOC ELDER MAL SCRN: HCPCS | Performed by: SPECIALIST

## 2021-02-02 PROCEDURE — G8427 DOCREV CUR MEDS BY ELIG CLIN: HCPCS | Performed by: SPECIALIST

## 2021-02-02 PROCEDURE — G8510 SCR DEP NEG, NO PLAN REQD: HCPCS | Performed by: SPECIALIST

## 2021-02-02 PROCEDURE — G8417 CALC BMI ABV UP PARAM F/U: HCPCS | Performed by: SPECIALIST

## 2021-02-02 PROCEDURE — G0463 HOSPITAL OUTPT CLINIC VISIT: HCPCS | Performed by: SPECIALIST

## 2021-02-02 RX ORDER — SIMVASTATIN 20 MG/1
20 TABLET, FILM COATED ORAL
COMMUNITY
End: 2021-12-14

## 2021-02-02 NOTE — PATIENT INSTRUCTIONS
1) go on [place] and look for hand/foot bike 2) follow up with me in 6mo. 3) weight loss doctor on the [employer] end Dr. Nhi Prado with [facility]

## 2021-02-02 NOTE — PROGRESS NOTES
Room 2    Visit Vitals  /80 (BP 1 Location: Left upper arm, BP Patient Position: Sitting)   Pulse 68   Ht 5' 3\" (1.6 m)   Wt 252 lb 3.2 oz (114.4 kg)   SpO2 96%   BMI 44.68 kg/m²       Heart Cath 10-7-2020    Chest pain: no  Shortness of breath: no  Edema: no  Palpitations: no  Dizziness: no    New diagnosis/Surgeries: no    ER/Hospitalizations: ER 11-29-20 H/A neck pain    Refills: Nitro

## 2021-02-02 NOTE — PROGRESS NOTES
CARDIOLOGY OFFICE NOTE    Eduardo Post MD, [address], [place], [address] [facility]  Phone [phone]; Fax [phone]  Mobile [phone]   Voice Mail [phone]    LAST OFFICE VISIT : 8/18/2020  Michelle Mills MD       ATTENTION:   This medical record was transcribed using an electronic medical records/speech recognition system. Although proofread, it may and can contain electronic, spelling and other errors. Corrections may be executed at a later time. Please feel free to contact us for any clarifications as needed. ICD-10-CM ICD-9-CM   1. Elevated cholesterol  E78.00 272.0   2. Essential hypertension  I10 401.9   3. Controlled type 2 diabetes mellitus without complication, without long-term current use of insulin (HCC)  E11.9 250.00   4. ISAIAH (obstructive sleep apnea)  G47.33 327.23   5. Chest discomfort  R07.89 786.59            Camacho Current is a 78 y.o. female with   with HTN, angina, asthma, dyslipidemia, and obesity referred for follow up. Cardiac risk factors: dyslipidemia, obesity, hypertension, post-menopausal  I have personally obtained the history from the patient. HISTORY OF PRESENTING ILLNESS     Overall the pt states she is doing well but does state that recently she had an episode of chest discomfort for which she took nitroglycerin. She thinks she was lying down or possibly and her  got her upset at the time. She has not had any further discomfort or any other associated symptoms. [place]          ACTIVE PROBLEM LIST     Patient Active Problem List    Diagnosis Date Noted    Obesity, morbid ([address] 08/06/2019    HTN (hypertension) 07/06/2011    Angina pectoris ([address] 07/06/2011    Asthma 07/06/2011    Obesity 07/06/2011    Elevated cholesterol 07/06/2011    HSV-2 (herpes simplex virus 2) infection 07/06/2011    Osteopenia 07/06/2011           PAST MEDICAL HISTORY     Past Medical History:   Diagnosis Date    Acid indigestion     or heartburn    Arthritis     hand and feet    Asthma     Constipation     Diabetes (HCC)     GERD (gastroesophageal reflux disease)     Heart disease     High blood pressure     Low back pain, episodic     Multiple stiff joints     Nausea & vomiting     Shortness of breath     Sinus pressure     Sleep apnea     cpap @ hs    Sore throat     Stroke [facility])     TIA 2017    Trouble in sleeping     Wheezing            PAST SURGICAL HISTORY     Past Surgical History:   Procedure Laterality Date    BIOPSY BREAST      COLONOSCOPY N/A 5/26/2020    COLONOSCOPY performed by Rosalba Jaramillo MD at [address] [place] [address], [place], DIAGNOSTIC  05/09    RI BREAST SURGERY PROCEDURE UNLISTED      l lumpectomy, l biopsy    RI BREAST SURGERY PROCEDURE UNLISTED      r surgery to the nipple    RI CARDIAC SURG PROCEDURE UNLIST      checked for blockages          ALLERGIES     Allergies   Allergen Reactions    Lisinopril Angioedema    Pork/Porcine Containing Products Angioedema    Pcn [Penicillins] Rash          FAMILY HISTORY     Family History   Problem Relation Age of Onset    Breast Cancer Mother     Hypertension Mother     Cancer Mother     Diabetes Father     Coronary Artery Disease Sister     Breast Cancer Sister     Cancer Sister     Hypertension Brother     Heart Attack Maternal Grandmother     Dementia Paternal Grandmother     Hypertension Brother     Cancer Brother         Pancreas & Liver ca    Cancer Maternal Aunt     Cancer Paternal Aunt     negative for cardiac disease       SOCIAL HISTORY     Social History     Socioeconomic History    Marital status:      Spouse name: Not on file    Number of children: Not on file    Years of education: Not on file    Highest education level: Not on file   Tobacco Use    Smoking status: Never Smoker    Smokeless tobacco: Never Used   Substance and Sexual Activity    Alcohol use: Yes     Comment: rare occasions    Drug use: No    Sexual activity: Yes     Partners: Male         MEDICATIONS     Current Outpatient Medications   Medication Sig    simvastatin (ZOCOR) 20 mg tablet Take 20 mg by mouth nightly.  losartan-hydroCHLOROthiazide (HYZAAR) 100-25 mg per tablet Take 1 Tab by mouth daily.  cyclobenzaprine (FLEXERIL) 10 mg tablet Take 1 Tab by mouth three (3) times daily as needed for Muscle Spasm(s).  metFORMIN (GLUCOPHAGE) 500 mg tablet Take 1 Tab by mouth daily (with breakfast).  allopurinoL (ZYLOPRIM) 300 mg tablet Take 300 mg by mouth daily.  fexofenadine (ALLEGRA) 180 mg tablet Take 180 mg by mouth daily.  dilTIAZem ER (Tiadylt ER) 300 mg capsule Take 300 mg by mouth daily.  QUEtiapine (SEROquel) 25 mg tablet Take 50 mg by mouth nightly.  nitroglycerin (NITROSTAT) 0.4 mg SL tablet 1 Tab by SubLINGual route every five (5) minutes as needed for Chest Pain.  multivitamin (ONE A DAY) tablet Take 1 Tab by mouth daily.  ascorbic acid, vitamin C, (VITAMIN C) 250 mg tablet Take 250 mg by mouth.  omega 3-dha-epa-fish oil 100-160-1,000 mg cap Take  by mouth.  carvedilol (COREG) 25 mg tablet Take 25 mg by mouth two (2) times daily (with meals).  omeprazole (PRILOSEC) 20 mg capsule Take 20 mg by mouth daily.  potassium chloride SR (KLOR-CON 10) 10 mEq tablet Take 10 mEq by mouth two (2) times a day.  Cholecalciferol, Vitamin D3, (VITAMIN D) 1,000 unit Cap Take 2,000 Units by mouth.  aspirin 81 mg tablet Take 162 mg by mouth daily.  LOSARTAN PO Take 100 mg by mouth daily.  hydroCHLOROthiazide (HYDRODIURIL) 25 mg tablet Take 25 mg by mouth daily.  Biotin 2,500 mcg cap Take  by mouth.  cyclobenzaprine (FLEXERIL) 10 mg tablet Take  by mouth three (3) times daily as needed for Muscle Spasm(s).  simvastatin (ZOCOR) 10 mg tablet Take 20 mg by mouth nightly.      No current facility-administered medications for this visit. I have reviewed the nurses notes, vitals, problem list, allergy list, medical history, family, social history and medications. REVIEW OF SYMPTOMS      General: Pt denies excessive weight gain or loss. Pt is able to conduct ADL's  HEENT: Denies blurred vision, headaches, hearing loss, epistaxis and difficulty swallowing. Respiratory: Denies cough, congestion, shortness of breath, BERMUDEZ, wheezing or stridor. Cardiovascular: Denies precordial pain, palpitations, edema or PND  Gastrointestinal: Denies poor appetite, indigestion, abdominal pain or blood in stool  Genitourinary: Denies hematuria, dysuria, increased urinary frequency  Musculoskeletal: Denies joint pain or swelling from muscles or joints  Neurologic: Denies tremor, paresthesias, headache, or sensory motor disturbance  Psychiatric: Denies confusion, insomnia, depression  Integumentray: Denies rash, itching or ulcers. Hematologic: Denies easy bruising, bleeding     PHYSICAL EXAMINATION      Vitals:    02/02/21 1013   BP: 120/80   Pulse: 68   SpO2: 96%   Weight: 252 lb 3.2 oz (114.4 kg)   Height: 5' 3\" (1.6 m)     General: Well developed, in no acute distress. HEENT: No jaundice, oral mucosa moist, no oral ulcers  Neck: Supple, no stiffness, no lymphadenopathy, supple  Heart:  Normal S1/S2 negative S3 or S4. Regular, no murmur, gallop or rub, no jugular venous distention  Respiratory: Clear bilaterally x 4, no wheezing or rales  Abdomen:   Soft, non-tender, bowel sounds are active. Extremities:  No edema, normal cap refill, no cyanosis. Musculoskeletal: No clubbing, no deformities  Neuro: A&Ox3, speech clear, gait stable, cooperative, no focal neurologic deficits  Skin: Skin color is normal. No rashes or lesions. Non diaphoretic, moist.  Vascular: 2+ pulses symmetric in all extremities           DIAGNOSTIC DATA     1.  Stress Test   3/7/13- NM stress- no ischemia, EF 68%   10/22/18-Cardiolite-no ischemia, EF 75% 8/17/20-Lexiscan-Myocardial perfusion imaging defect 1: There is a defect that is small to moderate in size present in the basal-apical anterior location(s) that is partially reversible. The defect appears to probably be artifact caused by breast attenuation. Defect improves with prone stress imaging. The possibility of infarction cannot be excluded. 2. Echo   2/25/13- EF 55%, MV mild annular calcification, TR mild, PAP 35 mmHg     3. LE Doppler   5/10/18- No evidence of right lower extremity vein thrombosis. 4. Cholesterol   9/5/18: , HDL 45, LDL 83, .   6/5/19- , HDL 42, LDL 79,    10/14/20- , HDL 42, LDL 92,     5. Coronary CT angio/Ca++ scoring   (6/24/19): 1. Left main originate normally from left coronary cusp and is normal size   without any significant atherosclerotic plaque or calcification. 2. The LAD is normal size with significant tortuosity. There is a mild soft   plaque in the mid LAD without any significant stenosis. There is no significant   calcification. The D1 and D2 diagonal branches were seen without any significant   lesion or calcification. 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 3. The left circumflex is small size vessel with significant tortuosity. There   is no significant lesion or calcification. The OM1 branch is very small without   any significant disease. 4. The RCA is normal size vessel and originate normally from the right coronary   cusp. The RCA is highly tortuous. The mid RCA demonstrate mild soft plaque   without any significant luminal stenosis. CALCIUM SCORE =0     6.  Cardiac Cath   10/7/20-No coronary artery disease detected however there are some tortuosity to her vessels possibly secondary to hypertension         LABORATORY DATA            Lab Results   Component Value Date/Time    WBC 11.6 (H) 11/29/2020 01:56 PM    HGB 13.5 11/29/2020 01:56 PM    HCT 40.9 11/29/2020 01:56 PM    PLATELET 088 85/91/3303 01:56 PM MCV 90.9 11/29/2020 01:56 PM      Lab Results   Component Value Date/Time    Sodium 144 11/29/2020 01:56 PM    Potassium 3.6 11/29/2020 01:56 PM    Chloride 106 11/29/2020 01:56 PM    CO2 29 11/29/2020 01:56 PM    Anion gap 9 11/29/2020 01:56 PM    Glucose 98 11/29/2020 01:56 PM    BUN 15 11/29/2020 01:56 PM    Creatinine 0.89 11/29/2020 01:56 PM    BUN/Creatinine ratio 17 11/29/2020 01:56 PM    GFR est AA >60 11/29/2020 01:56 PM    GFR est non-AA >60 11/29/2020 01:56 PM    Calcium 9.7 11/29/2020 01:56 PM    Bilirubin, total 0.4 02/25/2020 11:23 AM    Alk. phosphatase 83 02/25/2020 11:23 AM    Protein, total 6.7 02/25/2020 11:23 AM    Albumin 4.4 02/25/2020 11:23 AM    Globulin 3.8 06/04/2019 12:25 PM    A-G Ratio 1.0 (L) 06/04/2019 12:25 PM    ALT (SGPT) 23 02/25/2020 11:23 AM           ASSESSMENT/RECOMMENDATIONS:.       1. HTN  - Blood pressure is at goal on 4 agents  -Continue low-sodium diet  2. Dyslipidemia  - Lipids are at goal in the past but recently the LDL has gone up to 92. Her underlying diabetes her LDL should be closer to 70. I would favor switching her to Crestor 20 mg if the next cholesterol profile is elevated. 3. Chest discomfort  -No further chest discomfort and actually she says the situation has improved significantly. Her stress test indicated a defect but then it was felt to be related to breast attenuation. Calcium scoring in the past was 0  -Talked about risk factor modification and asked that she get a hand foot bike to exercise with. 4.  Diabetes unclear what her last hemoglobin A1c was    Follow-up with me in 6 months. Abnormal stress test in the anterior apical region. Orders Placed This Encounter    simvastatin (ZOCOR) 20 mg tablet     Sig: Take 20 mg by mouth nightly. We discussed the expected course, resolution and complications of the diagnosis(es) in detail. Medication risks, benefits, costs, interactions, and alternatives were discussed as indicated.   I advised him to contact the office if his condition worsens, changes or fails to improve as anticipated. He expressed understanding with the diagnosis(es) and plan          Follow-up and Dispositions  ·   Return in about 6 months (around 8/2/2021). I have discussed the diagnosis with  Yazan Frote and the intended plan as seen in the above orders. Questions were answered concerning future plans. I have discussed medication side effects and warnings with the patient as well. Thank you,  Yahir Houston MD for involving me in the care of  Yazan Forte. Please do not hesitate to contact me for further questions/concerns. Eduardo Davidson MD, [address] [place] [address] Rd[address]      [facility], [address] [place] [address] [facility]      [phone] / [phone] Fax

## 2021-04-25 ENCOUNTER — HOSPITAL ENCOUNTER (EMERGENCY)
Age: 71
Discharge: HOME | End: 2021-04-25
Attending: EMERGENCY MEDICINE
Payer: MEDICARE

## 2021-04-25 VITALS
RESPIRATION RATE: 16 BRPM | DIASTOLIC BLOOD PRESSURE: 73 MMHG | TEMPERATURE: 98.1 F | OXYGEN SATURATION: 96 % | HEIGHT: 63 IN | BODY MASS INDEX: 45.51 KG/M2 | WEIGHT: 256.84 LBS | HEART RATE: 65 BPM | SYSTOLIC BLOOD PRESSURE: 165 MMHG

## 2021-04-25 DIAGNOSIS — H60.503 ACUTE OTITIS EXTERNA OF BOTH EARS, UNSPECIFIED TYPE: Primary | ICD-10-CM

## 2021-04-25 PROCEDURE — 99282 EMERGENCY DEPT VISIT SF MDM: CPT

## 2021-04-25 RX ORDER — COLISTIN SULFATE, NEOMYCIN SULFATE, THONZONIUM BROMIDE AND HYDROCORTISONE ACETATE 3; 3.3; .5; 1 MG/ML; MG/ML; MG/ML; MG/ML
3 SUSPENSION AURICULAR (OTIC) 4 TIMES DAILY
Qty: 10 ML | Refills: 0 | Status: SHIPPED | OUTPATIENT
Start: 2021-04-25 | End: 2021-05-02

## 2021-04-25 NOTE — ED PROVIDER NOTES
42-year-old female with a history of diabetes presents with itchiness to bilateral ears. She had drainage from her left ear that was bloody yesterday. She denies any fevers. She denies any pain. She denies any known injury. She has had some nasal congestion and sinus congestion. She denies any cough.       Other         Past Medical History:   Diagnosis Date    Acid indigestion     or heartburn    Arthritis     hand and feet    Asthma     Constipation     Diabetes (HCC)     GERD (gastroesophageal reflux disease)     Heart disease     High blood pressure     Low back pain, episodic     Multiple stiff joints     Nausea & vomiting     Shortness of breath     Sinus pressure     Sleep apnea     cpap @ hs    Sore throat     Stroke [facility])     TIA 2017    Trouble in sleeping     Wheezing        Past Surgical History:   Procedure Laterality Date    BIOPSY BREAST      COLONOSCOPY N/A 5/26/2020    COLONOSCOPY performed by Jamie Perez MD at [address] [place] [address] [place], [place], DIAGNOSTIC  05/09    FL BREAST SURGERY PROCEDURE UNLISTED      l lumpectomy, l biopsy    FL BREAST SURGERY PROCEDURE UNLISTED      r surgery to the nipple    FL CARDIAC SURG PROCEDURE UNLIST      checked for blockages         Family History:   Problem Relation Age of Onset    Breast Cancer Mother     Hypertension Mother     Cancer Mother     Diabetes Father     Coronary Artery Disease Sister     Breast Cancer Sister     Cancer Sister     Hypertension Brother     Heart Attack Maternal Grandmother     Dementia Paternal Grandmother     Hypertension Brother     Cancer Brother         Pancreas & Liver ca    Cancer Maternal Aunt     Cancer Paternal Aunt        Social History     Socioeconomic History    Marital status:      Spouse name: Not on file    Number of children: Not on file    Years of education: Not on file    Highest education level: Not on file   Occupational History    Not on file Social Needs    Financial resource strain: Not on file    Food insecurity     Worry: Not on file     Inability: Not on file    Transportation needs     Medical: Not on file     Non-medical: Not on file   Tobacco Use    Smoking status: Never Smoker    Smokeless tobacco: Never Used   Substance and Sexual Activity    Alcohol use: Yes     Comment: rare occasions    Drug use: No    Sexual activity: Yes     Partners: Male   Lifestyle    Physical activity     Days per week: Not on file     Minutes per session: Not on file    Stress: Not on file   Relationships    Social connections     Talks on phone: Not on file     Gets together: Not on file     Attends Sikhism service: Not on file     Active member of club or organization: Not on file     Attends meetings of clubs or organizations: Not on file     Relationship status: Not on file    Intimate partner violence     Fear of current or ex partner: Not on file     Emotionally abused: Not on file     Physically abused: Not on file     Forced sexual activity: Not on file   Other Topics Concern    Not on file   Social History Narrative    Not on file         ALLERGIES: Lisinopril, Pork/porcine containing products, and Pcn [penicillins]    Review of Systems   All other systems reviewed and are negative. Vitals:    04/25/21 1320   BP: (!) 165/73   Pulse: 65   Resp: 16   Temp: 98.1 °F (36.7 °C)   SpO2: 96%   Weight: 116.5 kg (256 lb 13.4 oz)   Height: 5' 3\" (1.6 m)            Physical Exam  Constitutional:       Appearance: She is well-developed. HENT:      Head: Normocephalic and atraumatic. Right Ear: Tympanic membrane normal. Swelling present. Left Ear: Tympanic membrane normal. Swelling present. Ears:      Comments: Bilateral external ear canal swelling     Nose: Congestion present. No rhinorrhea. Eyes:      General: No scleral icterus. Neck:      Musculoskeletal: No neck rigidity. Trachea: No tracheal deviation.    Cardiovascular: Rate and Rhythm: Normal rate. Pulmonary:      Effort: Pulmonary effort is normal. No respiratory distress. Abdominal:      General: There is no distension. Genitourinary:     Comments: deferred  Musculoskeletal:         General: No deformity. Skin:     General: Skin is dry. Neurological:      General: No focal deficit present. Mental Status: She is alert. Psychiatric:         Mood and Affect: Mood normal.          MDM       Procedures        2:16 PM  Patient re-evaluated. All questions answered. Patient appropriate for discharge. Given return precautions and follow up instructions. LABORATORY TESTS:  Labs Reviewed - No data to display    IMAGING RESULTS:  No orders to display       MEDICATIONS GIVEN:  Medications - No data to display    IMPRESSION:  1. Acute otitis externa of both ears, unspecified type        PLAN:  1. Current Discharge Medication List      START taking these medications    Details   neomycin-colist-hydrocortisone-thonzonium (Cortisporin-TC) otic suspension Administer 3 Drops into each ear four (4) times daily for 7 days. Qty: 10 mL, Refills: 0           2. Follow-up Information     Follow up With Specialties Details Why Contact Info    Rosario Angel MD Family Medicine Schedule an appointment as soon as possible for a visit   [place] [phone]      [address] [facility] DEPT Emergency Medicine  If symptoms worsen or new concerns [facility] [address]  [phone]        3. Return to ED for new or worsening symptoms       Kilo Miranda MD

## 2021-04-25 NOTE — ED NOTES
Pt was discharged and given instructions by Dr Brittany Miranda . Pt verbalized good understanding of all discharge instructions,prescriptions and F/U care. All questions answered. Pt in stable condition on discharge.

## 2021-04-25 NOTE — ED TRIAGE NOTES
Pt ambulated to the treatment area with a steady gait. Pt states \"both my ears have been itchy and yesterday when I woke up there was blood on my pillow. And today there was dried blood in my left ear. \" Pt denies pain. Pt denies known injury to the ear.

## 2021-12-08 ENCOUNTER — TELEPHONE (OUTPATIENT)
Dept: CARDIOLOGY CLINIC | Age: 71
End: 2021-12-08

## 2021-12-08 NOTE — TELEPHONE ENCOUNTER
Medical records request has been fax to [place], [place], at [phone] (fax). Records requested: recent office note and all labs. Confirmation received.

## 2021-12-14 ENCOUNTER — OFFICE VISIT (OUTPATIENT)
Dept: CARDIOLOGY CLINIC | Age: 71
End: 2021-12-14
Payer: MEDICARE

## 2021-12-14 VITALS
SYSTOLIC BLOOD PRESSURE: 126 MMHG | OXYGEN SATURATION: 97 % | HEART RATE: 70 BPM | HEIGHT: 63 IN | BODY MASS INDEX: 46.95 KG/M2 | WEIGHT: 265 LBS | DIASTOLIC BLOOD PRESSURE: 70 MMHG

## 2021-12-14 DIAGNOSIS — G47.33 OSA (OBSTRUCTIVE SLEEP APNEA): ICD-10-CM

## 2021-12-14 DIAGNOSIS — I10 ESSENTIAL HYPERTENSION: ICD-10-CM

## 2021-12-14 DIAGNOSIS — E78.5 DYSLIPIDEMIA: ICD-10-CM

## 2021-12-14 DIAGNOSIS — E11.9 CONTROLLED TYPE 2 DIABETES MELLITUS WITHOUT COMPLICATION, WITHOUT LONG-TERM CURRENT USE OF INSULIN (HCC): ICD-10-CM

## 2021-12-14 DIAGNOSIS — E78.00 ELEVATED CHOLESTEROL: Primary | ICD-10-CM

## 2021-12-14 DIAGNOSIS — R07.89 CHEST DISCOMFORT: ICD-10-CM

## 2021-12-14 PROCEDURE — 2022F DILAT RTA XM EVC RTNOPTHY: CPT | Performed by: SPECIALIST

## 2021-12-14 PROCEDURE — 93010 ELECTROCARDIOGRAM REPORT: CPT | Performed by: SPECIALIST

## 2021-12-14 PROCEDURE — G8427 DOCREV CUR MEDS BY ELIG CLIN: HCPCS | Performed by: SPECIALIST

## 2021-12-14 PROCEDURE — G8432 DEP SCR NOT DOC, RNG: HCPCS | Performed by: SPECIALIST

## 2021-12-14 PROCEDURE — G8752 SYS BP LESS 140: HCPCS | Performed by: SPECIALIST

## 2021-12-14 PROCEDURE — 1090F PRES/ABSN URINE INCON ASSESS: CPT | Performed by: SPECIALIST

## 2021-12-14 PROCEDURE — G8536 NO DOC ELDER MAL SCRN: HCPCS | Performed by: SPECIALIST

## 2021-12-14 PROCEDURE — G0463 HOSPITAL OUTPT CLINIC VISIT: HCPCS | Performed by: SPECIALIST

## 2021-12-14 PROCEDURE — 99214 OFFICE O/P EST MOD 30 MIN: CPT | Performed by: SPECIALIST

## 2021-12-14 PROCEDURE — G8417 CALC BMI ABV UP PARAM F/U: HCPCS | Performed by: SPECIALIST

## 2021-12-14 PROCEDURE — 3046F HEMOGLOBIN A1C LEVEL >9.0%: CPT | Performed by: SPECIALIST

## 2021-12-14 PROCEDURE — 1101F PT FALLS ASSESS-DOCD LE1/YR: CPT | Performed by: SPECIALIST

## 2021-12-14 PROCEDURE — 93005 ELECTROCARDIOGRAM TRACING: CPT | Performed by: SPECIALIST

## 2021-12-14 PROCEDURE — G8754 DIAS BP LESS 90: HCPCS | Performed by: SPECIALIST

## 2021-12-14 PROCEDURE — G8399 PT W/DXA RESULTS DOCUMENT: HCPCS | Performed by: SPECIALIST

## 2021-12-14 PROCEDURE — 3017F COLORECTAL CA SCREEN DOC REV: CPT | Performed by: SPECIALIST

## 2021-12-14 NOTE — PROGRESS NOTES
Sandy Orozco is a 73 y.o. female    Visit Vitals  /70 (BP 1 Location: Left upper arm, BP Patient Position: Sitting, BP Cuff Size: Adult)   Pulse 70   Ht 5' 3\" (1.6 m)   Wt 265 lb (120.2 kg)   SpO2 97%   BMI 46.94 kg/m²       Chief Complaint   Patient presents with    Hypertension    Chest Pain    Cholesterol Problem       Chest pain NO  SOB YES ASTHMA  Dizziness NO  Swelling LEFT HAND  Recent hospital visit NO  Refills NO  COVID VACCINE STATUS YES  HAD COVID?  NO

## 2021-12-14 NOTE — LETTER
12/14/2021    Patient: Ernestine Stiles   YOB: 1950   Date of Visit: 12/14/2021     Lluvia Ferreira MD  [address]  [address] [place] [address]  Via Fax: [phone]    Dear Lluvia Ferreira MD,      Thank you for referring Ms. Mich Mcginnis to [facility] for evaluation. My notes for this consultation are attached. If you have questions, please do not hesitate to call me. I look forward to following your patient along with you.       Sincerely,    Charles Phillips MD

## 2021-12-14 NOTE — PROGRESS NOTES
CARDIOLOGY OFFICE NOTE    Eduardo Stark MD, [address], [place], [address] [facility]  Phone [phone]; Fax [phone]  Mobile [phone]   Voice Mail [phone]    LAST OFFICE VISIT : 8/18/2020  Mireya Ocasio MD       ATTENTION:   This medical record was transcribed using an electronic medical records/speech recognition system. Although proofread, it may and can contain electronic, spelling and other errors. Corrections may be executed at a later time. Please feel free to contact us for any clarifications as needed. ICD-10-CM ICD-9-CM   1. Elevated cholesterol  E78.00 272.0   2. ISAIAH (obstructive sleep apnea)  G47.33 327.23   3. Essential hypertension  I10 401.9   4. Chest discomfort  R07.89 786.59   5. Controlled type 2 diabetes mellitus without complication, without long-term current use of insulin (HCC)  E11.9 250.00   6. Dyslipidemia  E78.5 272.4            Sandy Orozco is a 73 y.o. female with   with HTN, angina, asthma, dyslipidemia, and obesity referred for follow up. Cardiac risk factors: dyslipidemia, obesity, hypertension, post-menopausal  I have personally obtained the history from the patient. HISTORY OF PRESENTING ILLNESS       Says she is doing well. She has no chest discomfort. She is motivated to lose weight because she has a trip on a cruise in June. She was not wearing her CPAP machine because it was recalled recently but her sleep doctor told her to wear it for the time being because the risk was not that great. I believe she is getting another CPAP machine. Reviewed the good carbohydrates to eat and instruct her to try eating a Mediterranean diet and reducing her carbohydrate intake.          ACTIVE PROBLEM LIST     Patient Active Problem List    Diagnosis Date Noted    Obesity, morbid ([address] 08/06/2019    HTN (hypertension) 07/06/2011    Angina pectoris ([address] 07/06/2011    Asthma 07/06/2011    Obesity 07/06/2011    Elevated cholesterol 07/06/2011    HSV-2 (herpes simplex virus 2) infection 07/06/2011    Osteopenia 07/06/2011           PAST MEDICAL HISTORY     Past Medical History:   Diagnosis Date    Acid indigestion     or heartburn    Arthritis     hand and feet    Asthma     Constipation     Diabetes ([address]     GERD (gastroesophageal reflux disease)     Heart disease     High blood pressure     Low back pain, episodic     Multiple stiff joints     Nausea & vomiting     Shortness of breath     Sinus pressure     Sleep apnea     cpap @ hs    Sore throat     Stroke [facility])     TIA 2017    Trouble in sleeping     Wheezing            PAST SURGICAL HISTORY     Past Surgical History:   Procedure Laterality Date    BIOPSY BREAST      COLONOSCOPY N/A 5/26/2020    COLONOSCOPY performed by Grey Luna MD at [address] [place] [address] [place], COLON, DIAGNOSTIC  05/09    [place] BREAST SURGERY PROCEDURE UNLISTED      l lumpectomy, l biopsy    [place] BREAST SURGERY PROCEDURE UNLISTED      r surgery to the nipple    [place] CARDIAC SURG PROCEDURE UNLIST      checked for blockages          ALLERGIES     Allergies   Allergen Reactions    Lisinopril Angioedema    Pork/Porcine Containing Products Angioedema    Pcn [Penicillins] Rash          FAMILY HISTORY     Family History   Problem Relation Age of Onset    Breast Cancer Mother     Hypertension Mother     Cancer Mother     Diabetes Father     Coronary Art Dis Sister     Breast Cancer Sister     Cancer Sister     Hypertension Brother     Heart Attack Maternal Grandmother     Dementia Paternal Grandmother     Hypertension Brother     Cancer Brother         Pancreas & Liver ca    Cancer Maternal Aunt     Cancer Paternal Aunt     negative for cardiac disease       SOCIAL HISTORY     Social History     Socioeconomic History    Marital status:    Tobacco Use    Smoking status: Never Smoker    Smokeless tobacco: Never Used   Substance and Sexual Activity    Alcohol use: Yes     Comment: rare occasions    Drug use: No    Sexual activity: Yes     Partners: Male         MEDICATIONS     Current Outpatient Medications   Medication Sig    metFORMIN (GLUCOPHAGE) 500 mg tablet Take 1 Tab by mouth daily (with breakfast).  allopurinoL (ZYLOPRIM) 300 mg tablet Take 300 mg by mouth daily.  fexofenadine (ALLEGRA) 180 mg tablet Take 180 mg by mouth daily.  dilTIAZem ER (Tiadylt ER) 300 mg capsule Take 300 mg by mouth daily.  LOSARTAN PO Take 100 mg by mouth daily.  hydroCHLOROthiazide (HYDRODIURIL) 25 mg tablet Take 25 mg by mouth daily.  QUEtiapine (SEROquel) 25 mg tablet Take 50 mg by mouth nightly.  nitroglycerin (NITROSTAT) 0.4 mg SL tablet 1 Tab by SubLINGual route every five (5) minutes as needed for Chest Pain.  multivitamin (ONE A DAY) tablet Take 1 Tab by mouth daily.  ascorbic acid, vitamin C, (VITAMIN C) 250 mg tablet Take 250 mg by mouth.  Biotin 2,500 mcg cap Take  by mouth.  cyclobenzaprine (FLEXERIL) 10 mg tablet Take  by mouth three (3) times daily as needed for Muscle Spasm(s).  omega 3-dha-epa-fish oil 100-160-1,000 mg cap Take  by mouth.  carvedilol (COREG) 25 mg tablet Take 25 mg by mouth two (2) times daily (with meals).  omeprazole (PRILOSEC) 20 mg capsule Take 20 mg by mouth daily.  simvastatin (ZOCOR) 10 mg tablet Take 20 mg by mouth nightly.  potassium chloride SR (KLOR-CON 10) 10 mEq tablet Take 10 mEq by mouth two (2) times a day.  Cholecalciferol, Vitamin D3, (VITAMIN D) 1,000 unit Cap Take 2,000 Units by mouth.  aspirin 81 mg tablet Take 162 mg by mouth daily.  simvastatin (ZOCOR) 20 mg tablet Take 20 mg by mouth nightly. (Patient not taking: Reported on 12/14/2021)    losartan-hydroCHLOROthiazide (HYZAAR) 100-25 mg per tablet Take 1 Tab by mouth daily.  (Patient not taking: Reported on 12/14/2021)    cyclobenzaprine (FLEXERIL) 10 mg tablet Take 1 Tab by mouth three (3) times daily as needed for Muscle Spasm(s). (Patient not taking: Reported on 12/14/2021)     No current facility-administered medications for this visit. I have reviewed the nurses notes, vitals, problem list, allergy list, medical history, family, social history and medications. REVIEW OF SYMPTOMS   Pertinent positives per HPI  General: Pt denies excessive weight gain or loss. Pt is able to conduct ADL's  HEENT: Denies blurred vision, headaches, hearing loss, epistaxis and difficulty swallowing. Respiratory: Denies cough, congestion, shortness of breath, BERMUDEZ, wheezing or stridor. Cardiovascular: Denies precordial pain, palpitations, edema or PND  Gastrointestinal: Denies poor appetite, indigestion, abdominal pain or blood in stool  Genitourinary: Denies hematuria, dysuria, increased urinary frequency  Musculoskeletal: Denies joint pain or swelling from muscles or joints  Neurologic: Denies tremor, paresthesias, headache, or sensory motor disturbance  Psychiatric: Denies confusion, insomnia, depression  Integumentray: Denies rash, itching or ulcers. Hematologic: Denies easy bruising, bleeding     PHYSICAL EXAMINATION      Vitals:    12/14/21 1008   BP: 126/70   Pulse: 70   SpO2: 97%   Weight: 265 lb (120.2 kg)   Height: 5' 3\" (1.6 m)     General: Well developed, in no acute distress. HEENT: No jaundice, oral mucosa moist, no oral ulcers  Neck: Supple, no stiffness, no lymphadenopathy, supple  Heart:  Normal S1/S2 negative S3 or S4. Regular, no murmur, gallop or rub, no jugular venous distention  Respiratory: Clear bilaterally x 4, no wheezing or rales  Extremities:  No edema, normal cap refill, no cyanosis. Musculoskeletal: No clubbing, no deformities  Neuro: A&Ox3, speech clear, gait stable, cooperative, no focal neurologic deficits  Skin: Skin color is normal. No rashes or lesions. Non diaphoretic, moist.           DIAGNOSTIC DATA     1.  Stress Test   3/7/13- NM stress- no ischemia, EF 68% 10/22/18-Cardiolite-no ischemia, EF 75%   8/17/20-Lexiscan-Myocardial perfusion imaging defect 1: There is a defect that is small to moderate in size present in the basal-apical anterior location(s) that is partially reversible. The defect appears to probably be artifact caused by breast attenuation. Defect improves with prone stress imaging. The possibility of infarction cannot be excluded. 2. Echo   2/25/13- EF 55%, MV mild annular calcification, TR mild, PAP 35 mmHg     3. LE Doppler   5/10/18- No evidence of right lower extremity vein thrombosis. 4. Cholesterol   9/5/18: , HDL 45, LDL 83, .   6/5/19- , HDL 42, LDL 79,    10/14/20- , HDL 42, LDL 92,   4/15/21- , HDL 43, LDL 75,     5. Coronary CT angio/Ca++ scoring   (6/24/19): 1. Left main originate normally from left coronary cusp and is normal size   without any significant atherosclerotic plaque or calcification. 2. The LAD is normal size with significant tortuosity. There is a mild soft   plaque in the mid LAD without any significant stenosis. There is no significant   calcification. The D1 and D2 diagonal branches were seen without any significant   lesion or calcification. 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 3. The left circumflex is small size vessel with significant tortuosity. There   is no significant lesion or calcification. The OM1 branch is very small without   any significant disease. 4. The RCA is normal size vessel and originate normally from the right coronary   cusp. The RCA is highly tortuous. The mid RCA demonstrate mild soft plaque   without any significant luminal stenosis. CALCIUM SCORE =0     6.  Cardiac Cath   10/7/20-No coronary artery disease detected however there are some tortuosity to her vessels possibly secondary to hypertension         LABORATORY DATA            Lab Results   Component Value Date/Time    WBC 11.6 (H) 11/29/2020 01:56 PM    HGB 13.5 11/29/2020 01:56 PM    HCT 40.9 11/29/2020 01:56 PM    PLATELET 290 61/97/9835 01:56 PM    MCV 90.9 11/29/2020 01:56 PM      Lab Results   Component Value Date/Time    Sodium 144 11/29/2020 01:56 PM    Potassium 3.6 11/29/2020 01:56 PM    Chloride 106 11/29/2020 01:56 PM    CO2 29 11/29/2020 01:56 PM    Anion gap 9 11/29/2020 01:56 PM    Glucose 98 11/29/2020 01:56 PM    BUN 15 11/29/2020 01:56 PM    Creatinine 0.89 11/29/2020 01:56 PM    BUN/Creatinine ratio 17 11/29/2020 01:56 PM    GFR est AA >60 11/29/2020 01:56 PM    GFR est non-AA >60 11/29/2020 01:56 PM    Calcium 9.7 11/29/2020 01:56 PM    Bilirubin, total 0.4 02/25/2020 11:23 AM    Alk. phosphatase 83 02/25/2020 11:23 AM    Protein, total 6.7 02/25/2020 11:23 AM    Albumin 4.4 02/25/2020 11:23 AM    Globulin 3.8 06/04/2019 12:25 PM    A-G Ratio 1.0 (L) 06/04/2019 12:25 PM    ALT (SGPT) 23 02/25/2020 11:23 AM           ASSESSMENT/RECOMMENDATIONS:.       1. HTN  - Blood pressure is good no adjustments antihypertensives  -Continue low-sodium diet  2. Dyslipidemia  -LDL is at goal continue on current medical regimen  3. Chest discomfort  -Calcium score of 0 with negative stress test although I did say that there was most likely breast attenuation that caused the defect. 4. ISAIAH  -Has resumed using her CPAP machine  5. Diabetes   -Hemoglobin A1c should be under 7  -She understands in diabetics there is a risk of heart disease regardless of normalizing all of her values. Follow-up with me in 6 months. Abnormal stress test in the anterior apical region. Orders Placed This Encounter    AMB POC EKG ROUTINE W/ 12 LEADS, INTER & REP     Order Specific Question:   Reason for Exam:     Answer:   HTN       We discussed the expected course, resolution and complications of the diagnosis(es) in detail. Medication risks, benefits, costs, interactions, and alternatives were discussed as indicated.   I advised him to contact the office if his condition worsens, changes or fails to improve as anticipated. He expressed understanding with the diagnosis(es) and plan          Follow-up and Dispositions  ·   Return in about 6 months (around 6/14/2022). I have discussed the diagnosis with  Ceci Rabago and the intended plan as seen in the above orders. Questions were answered concerning future plans. I have discussed medication side effects and warnings with the patient as well. Thank you,  Juice Rios MD for involving me in the care of  Ceci Rabago. Please do not hesitate to contact me for further questions/concerns. Eduardo Davidson MD, [facility] [address]      [facility], [address] [place] [address] [facility]      [phone] / [phone] Fax

## 2021-12-14 NOTE — PATIENT INSTRUCTIONS
Research Glycemic Index and Glycemic Load on the internet. Vegetables that are low in glycemic Index include artichokes, asparagus, bean sprouts, broccoli, brussels sprouts and cauliflower. Fruits that are low in glycemic index include cherries, grapefruit, dried apricots, pears, apples, oranges, plums and strawberry.

## 2022-03-19 PROBLEM — E66.01 OBESITY, MORBID (HCC): Status: ACTIVE | Noted: 2019-08-06

## 2022-06-11 ENCOUNTER — HOSPITAL ENCOUNTER (EMERGENCY)
Age: 72
Discharge: HOME | End: 2022-06-11
Attending: STUDENT IN AN ORGANIZED HEALTH CARE EDUCATION/TRAINING PROGRAM
Payer: MEDICARE

## 2022-06-11 VITALS
HEART RATE: 62 BPM | WEIGHT: 245.15 LBS | SYSTOLIC BLOOD PRESSURE: 158 MMHG | BODY MASS INDEX: 45.11 KG/M2 | HEIGHT: 62 IN | RESPIRATION RATE: 18 BRPM | TEMPERATURE: 98.8 F | DIASTOLIC BLOOD PRESSURE: 77 MMHG | OXYGEN SATURATION: 95 %

## 2022-06-11 DIAGNOSIS — U07.1 COVID-19: Primary | ICD-10-CM

## 2022-06-11 PROCEDURE — 99282 EMERGENCY DEPT VISIT SF MDM: CPT

## 2022-06-11 RX ORDER — ACETAMINOPHEN 500 MG
1000 TABLET ORAL
COMMUNITY

## 2022-06-11 NOTE — ED PROVIDER NOTES
Patient is a 57-year-old female with [address] as well as a [employer] booster presenting the ED with 2 COVID-positive test at home after recent cruise travel. ABCs intact. No shortness of breath. Patient is on multiple medications with likely interactions to any other new COVID treatments. The history is provided by the patient.    Positive For Covid-19  Temp source:  Subjective  Severity:  Mild  Onset quality:  Sudden  Timing:  Constant  Progression:  Unchanged  Chronicity:  New  Relieved by:  Nothing  Worsened by:  Nothing  Ineffective treatments:  None tried  Associated symptoms: chills, cough and myalgias    Associated symptoms: no chest pain    Risk factors: recent sickness and sick contacts         Past Medical History:   Diagnosis Date    Acid indigestion     or heartburn    Arthritis     hand and feet    Asthma     Constipation     Diabetes ([address]     GERD (gastroesophageal reflux disease)     Heart disease     High blood pressure     Low back pain, episodic     Multiple stiff joints     Nausea & vomiting     Shortness of breath     Sinus pressure     Sleep apnea     cpap @ hs    Sore throat     Stroke [facility])     TIA 2017    Trouble in sleeping     Wheezing        Past Surgical History:   Procedure Laterality Date    BIOPSY BREAST      COLONOSCOPY N/A 5/26/2020    COLONOSCOPY performed by Jaydon Bar MD at [address] [place] [address], [place], DIAGNOSTIC  05/09    [place] BREAST SURGERY PROCEDURE UNLISTED      l lumpectomy, l biopsy    [place] BREAST SURGERY PROCEDURE UNLISTED      r surgery to the nipple    [place] CARDIAC SURG PROCEDURE UNLIST      checked for blockages         Family History:   Problem Relation Age of Onset    Breast Cancer Mother     Hypertension Mother     Cancer Mother     Diabetes Father     Coronary Art Dis Sister     Breast Cancer Sister     Cancer Sister     Hypertension Brother     Heart Attack Maternal Grandmother     Dementia Paternal Grandmother     Hypertension Brother     Cancer Brother         Pancreas & Liver ca    Cancer Maternal Aunt     Cancer Paternal Aunt        Social History     Socioeconomic History    Marital status:      Spouse name: Not on file    Number of children: Not on file    Years of education: Not on file    Highest education level: Not on file   Occupational History    Not on file   Tobacco Use    Smoking status: Never Smoker    Smokeless tobacco: Never Used   Substance and Sexual Activity    Alcohol use: Yes     Comment: rare occasions    Drug use: No    Sexual activity: Yes     Partners: Male   Other Topics Concern    Not on file   Social History Narrative    Not on file     Social Determinants of Health     Financial Resource Strain:     Difficulty of Paying Living Expenses: Not on file   Food Insecurity:     Worried About Running Out of Food in the Last Year: Not on file    Jeb of Food in the Last Year: Not on file   Transportation Needs:     Lack of Transportation (Medical): Not on file    Lack of Transportation (Non-Medical):  Not on file   Physical Activity:     Days of Exercise per Week: Not on file    Minutes of Exercise per Session: Not on file   Stress:     Feeling of Stress : Not on file   Social Connections:     Frequency of Communication with Friends and Family: Not on file    Frequency of Social Gatherings with Friends and Family: Not on file    Attends Buddhist Services: Not on file    Active Member of Clubs or Organizations: Not on file    Attends Club or Organization Meetings: Not on file    Marital Status: Not on file   Intimate Partner Violence:     Fear of Current or Ex-Partner: Not on file    Emotionally Abused: Not on file    Physically Abused: Not on file    Sexually Abused: Not on file   Housing Stability:     Unable to Pay for Housing in the Last Year: Not on file    Number of [place] in the Last Year: Not on file    Unstable Housing in the Last Year: Not on file         ALLERGIES: Lisinopril, Pork/porcine containing products, and Pcn [penicillins]    Review of Systems   Constitutional: Positive for chills. HENT: Negative. Eyes: Negative. Respiratory: Positive for cough. Cardiovascular: Negative. Negative for chest pain. Gastrointestinal: Negative. Endocrine: Negative. Genitourinary: Negative. Musculoskeletal: Positive for myalgias. Skin: Negative. Allergic/Immunologic: Negative. Neurological: Negative. Hematological: Negative. Psychiatric/Behavioral: Negative. Vitals:    06/11/22 1458 06/11/22 1523 06/11/22 1538 06/11/22 1553   BP: (!) 161/75 (!) 156/81 (!) 145/75 (!) 158/77   Pulse:       Resp:       Temp:       SpO2: 98% 96% 97% 95%   Weight:       Height:                Physical Exam  Vitals and nursing note reviewed. Constitutional:       General: She is not in acute distress. Appearance: Normal appearance. HENT:      Head: Normocephalic and atraumatic. Right Ear: External ear normal.      Left Ear: External ear normal.      Nose: Congestion and rhinorrhea present. Eyes:      Extraocular Movements: Extraocular movements intact. Conjunctiva/sclera: Conjunctivae normal.   Cardiovascular:      Rate and Rhythm: Normal rate. Pulses: Normal pulses. Radial pulses are 2+ on the right side and 2+ on the left side. Heart sounds: Normal heart sounds. Pulmonary:      Effort: Pulmonary effort is normal.      Breath sounds: Normal breath sounds. Chest:      Chest wall: No deformity or tenderness. Abdominal:      General: Abdomen is flat. There is no distension. Tenderness: There is no abdominal tenderness. Musculoskeletal:         General: No deformity or signs of injury. Normal range of motion. Cervical back: Normal range of motion and neck supple. No tenderness. Skin:     General: Skin is warm and dry.       Capillary Refill: Capillary refill takes less than 2 seconds. Neurological:      General: No focal deficit present. Mental Status: She is alert and oriented to person, place, and time. Psychiatric:         Attention and Perception: Attention normal.         Mood and Affect: Mood normal.         Behavior: Behavior normal.          [facility]       LABORATORY RESULTS:  Labs Reviewed - No data to display    MEDICATIONS GIVEN:  Medications - No data to display    Differential diagnosis: Viral illness, COVID-19    ED physician interpretation of laboratory results: Covid PCR pending    MDM: 73 y.o. female presents with symptoms c/w acute URI (cough, rhinorrhea, headache, myalgias) for 2 days. These symptoms are in setting of the Covid pandemic, a PCR Covid swab was obtained and is pending. Patient appears well. No signs of toxicity. No hypoxia, tachypnea or other signs of respiratory distress. Lungs CTAB. No signs of clinical dehydration. Doubt PNA, and no evidence of any other illness. Patient is appropriate for outpatient symptomatic treatment. As patient has a complex medical history and multiple medications with risk for interaction Paxil lobe with another current medications not indicated. Patient given information for COVID infusion center as well as recommended follow-up with her PCP for further evaluation. Key discharge instructions and summary of care: You presented to the ED with symptoms upper respiratory infection/viral infection without severe symptoms. Take Tylenol Motrin for symptoms including fever and pain. Increase your fluid intake. Patient is stable for discharge. All available radiology and laboratory results have been reviewed with patient and/or available family.   Patient and/or family verbally conveyed their understanding and agreement of the patient's signs, symptoms, diagnosis, treatment and prognosis and additionally agree to follow-up as recommended in the discharge instructions or to return to the Emergency Department should their condition change or worsen prior to their follow-up appointment. All questions have been answered and patient and/or available family express understanding. IMPRESSION:  1. COVID-19        DISPOSITION: Discharged    Chris Leary MD      Procedures

## 2022-06-11 NOTE — ED NOTES
Pt given discharge instructions by Dr Jeana Whitten pt also given pulse ox with instruction sheet and verbal instructions.   She verbalizes an understanding pt stable at time of discharge

## 2022-06-11 NOTE — DISCHARGE INSTRUCTIONS
You presented to ED with COVID-positive test at home. You have COVID symptoms without severe features. Information provided for COVID infusion center. Additionally call your primary care doctor for evaluation for other medications however based on your extensive medication list there are likely to me interactions at this time. Take Tylenol as needed for fever and pain. Use over-the-counter cough and congestion medications and throat lozenges.

## 2022-06-11 NOTE — ED TRIAGE NOTES
Pt ambulates to treatment area upon arrival to the room O2 saturation 98% on room air. Pt states that yesterday she returned from a cruise and tested positive for Covid. She did a second test this morning and was positive again. She has been taking Tylenol 1000mg for the headache and slight fever she had yesterday. She has a hacking cough and some body aches that seem to feel better after the Tylenol. She was told by family to come to be checked for Covid.   Pt denies any SOB or chest pain

## 2022-06-13 ENCOUNTER — PATIENT OUTREACH (OUTPATIENT)
Dept: CASE MANAGEMENT | Age: 72
End: 2022-06-13

## 2022-06-13 NOTE — PROGRESS NOTES
Patient contacted regarding COVID-19 diagnosis, pulse oximeter ordered at discharge. Discussed COVID-19 related testing which was not done at this time. Test results were Positive at home test. Patient informed of results, if available? no.     Ambulatory Care Manager contacted the patient by telephone to perform post discharge assessment. Call within 2 business days of discharge: Yes Verified name and  with patient as identifiers. Provided introduction to self, and explanation of the CTN/ACM role, and reason for call due to risk factors for infection and/or exposure to COVID-19. Symptoms reviewed with patient who verbalized the following symptoms: no new symptoms and no worsening symptoms      Due to no new or worsening symptoms encounter was not routed to provider for escalation. Discussed follow-up appointments. If no appointment was previously scheduled, appointment scheduling offered:  no. [facility] follow up appointment(s):   Future Appointments   Date Time Provider Sandra Sharif   2022  1:20 PM [facility]iff[facility]     [facility] follow up appointment(s): Interventions to address risk factors: Obtained and reviewed discharge summary and/or continuity of care documents     Advance Care Planning:   Does patient have an Advance Directive: not on file; education provided. Educated patient about risk for severe COVID-19 due to risk factors according to CDC guidelines. ACM reviewed discharge instructions, medical action plan and red flag symptoms with the patient who verbalized understanding. Discussed COVID vaccination status: yes. Education provided on COVID-19 vaccination as appropriate. Discussed exposure protocols and quarantine with CDC Guidelines. Patient was given an opportunity to verbalize any questions and concerns and agrees to contact ACM or health care provider for questions related to their healthcare.     Reviewed and educated patient on any new and changed medications related to discharge diagnosis     Was patient discharged with a pulse oximeter? yes Discussed and confirmed pulse oximeter discharge instructions and when to notify provider or seek emergency care. ACM provided contact information. Plan for follow-up call in 5-7 days based on severity of symptoms and risk factors.

## 2022-06-13 NOTE — PATIENT INSTRUCTIONS

## 2022-06-13 NOTE — PROGRESS NOTES
CARDIOLOGY OFFICE NOTE    Eduardo Lowery MD, [address], [place], [address] [facility]  Phone [phone]; Fax [phone] mobile [phone]   Voice Mail [phone]    LAST OFFICE VISIT : 8/18/2020  Shelia Chen MD       ATTENTION:   This medical record was transcribed using an electronic medical records/speech recognition system. Although proofread, it may and can contain electronic, spelling and other errors. Corrections may be executed at a later time. Please feel free to contact us for any clarifications as needed. ICD-10-CM ICD-9-CM   1. ISAIAH (obstructive sleep apnea)  G47.33 327.23   2. Essential hypertension  I10 401.9   3. Dyslipidemia  E78.5 272.4   4. Controlled type 2 diabetes mellitus without complication, without long-term current use of insulin (HCC)  E11.9 250.00   5. Obesity, morbid ([facility] [address]  E66.01 278.01   6. Chest pain, unspecified type  R07.9 786.50            Lamar Mittal is a 73 y.o. female with   with HTN, angina, asthma, dyslipidemia, and obesity referred for follow up. Cardiac risk factors: dyslipidemia, obesity, hypertension, post-menopausal  I have personally obtained the history from the patient. HISTORY OF PRESENTING ILLNESS       Says she is doing well. She has no chest discomfort. She is motivated to lose weight because she has a trip on a cruise in June. She was not wearing her CPAP machine because it was recalled recently but her sleep doctor told her to wear it for the time being because the risk was not that great. I believe she is getting another CPAP machine. Reviewed the good carbohydrates to eat and instruct her to try eating a Mediterranean diet and reducing her carbohydrate intake.          ACTIVE PROBLEM LIST     Patient Active Problem List    Diagnosis Date Noted    Obesity, morbid ([address] 08/06/2019    HTN (hypertension) 07/06/2011    Angina pectoris ([address] 07/06/2011    Asthma 07/06/2011    Obesity 07/06/2011    Elevated cholesterol 07/06/2011    HSV-2 (herpes simplex virus 2) infection 07/06/2011    Osteopenia 07/06/2011           PAST MEDICAL HISTORY     Past Medical History:   Diagnosis Date    Acid indigestion     or heartburn    Arthritis     hand and feet    Asthma     Constipation     Diabetes ([address]     GERD (gastroesophageal reflux disease)     Heart disease     High blood pressure     Low back pain, episodic     Multiple stiff joints     Nausea & vomiting     Shortness of breath     Sinus pressure     Sleep apnea     cpap @ hs    Sore throat     Stroke [facility])     TIA 2017    Trouble in sleeping     Wheezing            PAST SURGICAL HISTORY     Past Surgical History:   Procedure Laterality Date    BIOPSY BREAST      COLONOSCOPY N/A 5/26/2020    COLONOSCOPY performed by David Yi MD at [facility]. [address], COLON, DIAGNOSTIC  05/09    GA BREAST SURGERY PROCEDURE UNLISTED      l lumpectomy, l biopsy    GA BREAST SURGERY PROCEDURE UNLISTED      r surgery to the nipple    GA CARDIAC SURG PROCEDURE UNLIST      checked for blockages          ALLERGIES     Allergies   Allergen Reactions    Lisinopril Angioedema    Pork/Porcine Containing Products Angioedema    Pcn [Penicillins] Rash          FAMILY HISTORY     Family History   Problem Relation Age of Onset    Breast Cancer Mother     Hypertension Mother     Cancer Mother     Diabetes Father     Coronary Art Dis Sister     Breast Cancer Sister     Cancer Sister     Hypertension Brother     Heart Attack Maternal Grandmother     Dementia Paternal Grandmother     Hypertension Brother     Cancer Brother         Pancreas & Liver ca    Cancer Maternal Aunt     Cancer Paternal Aunt     negative for cardiac disease       SOCIAL HISTORY     Social History     Socioeconomic History    Marital status:    Tobacco Use    Smoking status: Never Smoker    Smokeless tobacco: Never Used   Substance and Sexual Activity    Alcohol use: Yes     Comment: rare occasions    Drug use: No    Sexual activity: Yes     Partners: Male         MEDICATIONS     Current Outpatient Medications   Medication Sig    acetaminophen (TYLENOL) 500 mg tablet Take 1,000 mg by mouth every six (6) hours as needed for Pain.  metFORMIN (GLUCOPHAGE) 500 mg tablet Take 1 Tab by mouth daily (with breakfast).  allopurinoL (ZYLOPRIM) 300 mg tablet Take 300 mg by mouth daily.  fexofenadine (ALLEGRA) 180 mg tablet Take 180 mg by mouth daily.  dilTIAZem ER (Tiadylt ER) 300 mg capsule Take 300 mg by mouth daily.  LOSARTAN PO Take 100 mg by mouth daily.  hydroCHLOROthiazide (HYDRODIURIL) 25 mg tablet Take 25 mg by mouth daily.  QUEtiapine (SEROquel) 25 mg tablet Take 50 mg by mouth nightly.  nitroglycerin (NITROSTAT) 0.4 mg SL tablet 1 Tab by SubLINGual route every five (5) minutes as needed for Chest Pain. (Patient not taking: Reported on 6/11/2022)    multivitamin (ONE A DAY) tablet Take 1 Tab by mouth daily.  ascorbic acid, vitamin C, (VITAMIN C) 250 mg tablet Take 250 mg by mouth. (Patient not taking: Reported on 6/11/2022)    Biotin 2,500 mcg cap Take  by mouth.  cyclobenzaprine (FLEXERIL) 10 mg tablet Take  by mouth three (3) times daily as needed for Muscle Spasm(s). (Patient not taking: Reported on 6/11/2022)    omega 3-dha-epa-fish oil 100-160-1,000 mg cap Take  by mouth.  carvedilol (COREG) 25 mg tablet Take 25 mg by mouth two (2) times daily (with meals).  omeprazole (PRILOSEC) 20 mg capsule Take 20 mg by mouth daily.  simvastatin (ZOCOR) 10 mg tablet Take 20 mg by mouth nightly.  potassium chloride SR (KLOR-CON 10) 10 mEq tablet Take 10 mEq by mouth two (2) times a day.  Cholecalciferol, Vitamin D3, (VITAMIN D) 1,000 unit Cap Take 2,000 Units by mouth.  aspirin 81 mg tablet Take 162 mg by mouth daily. No current facility-administered medications for this visit.        I have reviewed the nurses notes, vitals, problem list, allergy list, medical history, family, social history and medications. REVIEW OF SYMPTOMS   Pertinent positives per HPI  General: Pt denies excessive weight gain or loss. Pt is able to conduct ADL's  HEENT: Denies blurred vision, headaches, hearing loss, epistaxis and difficulty swallowing. Respiratory: Denies cough, congestion, shortness of breath, BERMUDEZ, wheezing or stridor. Cardiovascular: Denies precordial pain, palpitations, edema or PND  Gastrointestinal: Denies poor appetite, indigestion, abdominal pain or blood in stool  Genitourinary: Denies hematuria, dysuria, increased urinary frequency  Musculoskeletal: Denies joint pain or swelling from muscles or joints  Neurologic: Denies tremor, paresthesias, headache, or sensory motor disturbance  Psychiatric: Denies confusion, insomnia, depression  Integumentray: Denies rash, itching or ulcers. Hematologic: Denies easy bruising, bleeding     PHYSICAL EXAMINATION      There were no vitals filed for this visit. General: Well developed, in no acute distress. HEENT: No jaundice, oral mucosa moist, no oral ulcers  Neck: Supple, no stiffness, no lymphadenopathy, supple  Heart:  Normal S1/S2 negative S3 or S4. Regular, no murmur, gallop or rub, no jugular venous distention  Respiratory: Clear bilaterally x 4, no wheezing or rales  Extremities:  No edema, normal cap refill, no cyanosis. Musculoskeletal: No clubbing, no deformities  Neuro: A&Ox3, speech clear, gait stable, cooperative, no focal neurologic deficits  Skin: Skin color is normal. No rashes or lesions. Non diaphoretic, moist.           DIAGNOSTIC DATA     1. Stress Test   3/7/13- NM stress- no ischemia, EF 68%   10/22/18-Cardiolite-no ischemia, EF 75%   8/17/20-Lexiscan-Myocardial perfusion imaging defect 1: There is a defect that is small to moderate in size present in the basal-apical anterior location(s) that is partially reversible.  The defect appears to probably be artifact caused by breast attenuation. Defect improves with prone stress imaging. The possibility of infarction cannot be excluded. 2. Echo   2/25/13- EF 55%, MV mild annular calcification, TR mild, PAP 35 mmHg     3. LE Doppler   5/10/18- No evidence of right lower extremity vein thrombosis. 4. Cholesterol   9/5/18: , HDL 45, LDL 83, .   6/5/19- , HDL 42, LDL 79,    10/14/20- , HDL 42, LDL 92,   4/15/21- , HDL 43, LDL 75,     5. Coronary CT angio/Ca++ scoring   (6/24/19): 1. Left main originate normally from left coronary cusp and is normal size   without any significant atherosclerotic plaque or calcification. 2. The LAD is normal size with significant tortuosity. There is a mild soft   plaque in the mid LAD without any significant stenosis. There is no significant   calcification. The D1 and D2 diagonal branches were seen without any significant   lesion or calcification. 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 3. The left circumflex is small size vessel with significant tortuosity. There   is no significant lesion or calcification. The OM1 branch is very small without   any significant disease. 4. The RCA is normal size vessel and originate normally from the right coronary   cusp. The RCA is highly tortuous. The mid RCA demonstrate mild soft plaque   without any significant luminal stenosis. CALCIUM SCORE =0     6.  Cardiac Cath   10/7/20-No coronary artery disease detected however there are some tortuosity to her vessels possibly secondary to hypertension         LABORATORY DATA            Lab Results   Component Value Date/Time    WBC 11.6 (H) 11/29/2020 01:56 PM    HGB 13.5 11/29/2020 01:56 PM    HCT 40.9 11/29/2020 01:56 PM    PLATELET 805 58/37/6561 01:56 PM    MCV 90.9 11/29/2020 01:56 PM      Lab Results   Component Value Date/Time    Sodium 144 11/29/2020 01:56 PM    Potassium 3.6 11/29/2020 01:56 PM    Chloride 106 11/29/2020 01:56 PM    CO2 29 11/29/2020 01:56 PM    Anion gap 9 11/29/2020 01:56 PM    Glucose 98 11/29/2020 01:56 PM    BUN 15 11/29/2020 01:56 PM    Creatinine 0.89 11/29/2020 01:56 PM    BUN/Creatinine ratio 17 11/29/2020 01:56 PM    GFR est AA >60 11/29/2020 01:56 PM    GFR est non-AA >60 11/29/2020 01:56 PM    Calcium 9.7 11/29/2020 01:56 PM    Bilirubin, total 0.4 02/25/2020 11:23 AM    Alk. phosphatase 83 02/25/2020 11:23 AM    Protein, total 6.7 02/25/2020 11:23 AM    Albumin 4.4 02/25/2020 11:23 AM    Globulin 3.8 06/04/2019 12:25 PM    A-G Ratio 1.0 (L) 06/04/2019 12:25 PM    ALT (SGPT) 23 02/25/2020 11:23 AM           ASSESSMENT/RECOMMENDATIONS:.       1. HTN  - Blood pressure is good no adjustments antihypertensives  -Continue low-sodium diet  2. Dyslipidemia  -LDL is at goal continue on current medical regimen  3. Chest discomfort  -Calcium score of 0 with negative stress test although I did say that there was most likely breast attenuation that caused the defect. 4. ISAIAH  -Has resumed using her CPAP machine  5. Diabetes   -Hemoglobin A1c should be under 7  -She understands in diabetics there is a risk of heart disease regardless of normalizing all of her values. Follow-up with me in 6 months. Abnormal stress test in the anterior apical region. No orders of the defined types were placed in this encounter. We discussed the expected course, resolution and complications of the diagnosis(es) in detail. Medication risks, benefits, costs, interactions, and alternatives were discussed as indicated. I advised him to contact the office if his condition worsens, changes or fails to improve as anticipated. He expressed understanding with the diagnosis(es) and plan          Follow-up and Dispositions  ·   Return in about 6 months (around 12/14/2022). I have discussed the diagnosis with  Kia Coulter and the intended plan as seen in the above orders.   Questions were answered concerning future plans. I have discussed medication side effects and warnings with the patient as well. Thank you,  Deep Montiel MD for involving me in the care of  Poncho Jesus. Please do not hesitate to contact me for further questions/concerns. Eduardo Davidson MD, [facility] [address]      [facility], [address] [place] [address] [address]      [phone] / [phone] Fax

## 2022-06-14 ENCOUNTER — OFFICE VISIT (OUTPATIENT)
Dept: CARDIOLOGY CLINIC | Age: 72
End: 2022-06-14

## 2022-06-14 DIAGNOSIS — G47.33 OSA (OBSTRUCTIVE SLEEP APNEA): Primary | ICD-10-CM

## 2022-06-14 DIAGNOSIS — R07.9 CHEST PAIN, UNSPECIFIED TYPE: ICD-10-CM

## 2022-06-14 DIAGNOSIS — E66.01 OBESITY, MORBID (HCC): ICD-10-CM

## 2022-06-14 DIAGNOSIS — I10 ESSENTIAL HYPERTENSION: ICD-10-CM

## 2022-06-14 DIAGNOSIS — E11.9 CONTROLLED TYPE 2 DIABETES MELLITUS WITHOUT COMPLICATION, WITHOUT LONG-TERM CURRENT USE OF INSULIN (HCC): ICD-10-CM

## 2022-06-14 DIAGNOSIS — E78.5 DYSLIPIDEMIA: ICD-10-CM

## 2022-06-14 NOTE — LETTER
6/16/2022    Patient: Brandee Dominguez   YOB: 1950   Date of Visit: 6/14/2022     Paulie Fernandez MD  [address] [address] [facility]  Via Fax: [phone]    Dear Paulie Fernandez MD,      Thank you for referring Ms. Fara Hutton to [facility] for evaluation. My notes for this consultation are attached. If you have questions, please do not hesitate to call me. I look forward to following your patient along with you.       Sincerely,    Sharona Tracey MD

## 2022-06-29 ENCOUNTER — PATIENT OUTREACH (OUTPATIENT)
Dept: CASE MANAGEMENT | Age: 72
End: 2022-06-29

## 2022-06-29 NOTE — PROGRESS NOTES
Patient resolved from Transition of Care episode on 6/29/22. ACM/CTN was unsuccessful at contacting this patient today. Patient/family was provided the following resources and education related to COVID-19 during the initial call:                         Signs, symptoms and red flags related to COVID-19            CDC exposure and quarantine guidelines            [employer] exposure contact - [phone]            Contact for their local Department of Health                 Patient has not had any additional ED or hospital visits. No further outreach scheduled with this CTN/ACM. Episode of Care resolved. Patient has this CTN/ACM contact information if future needs arise.

## 2022-08-31 ENCOUNTER — OFFICE VISIT (OUTPATIENT)
Dept: CARDIOLOGY CLINIC | Age: 72
End: 2022-08-31
Payer: MEDICARE

## 2022-08-31 VITALS
WEIGHT: 240 LBS | BODY MASS INDEX: 44.16 KG/M2 | HEIGHT: 62 IN | SYSTOLIC BLOOD PRESSURE: 130 MMHG | DIASTOLIC BLOOD PRESSURE: 80 MMHG | HEART RATE: 76 BPM

## 2022-08-31 DIAGNOSIS — E11.9 CONTROLLED TYPE 2 DIABETES MELLITUS WITHOUT COMPLICATION, WITHOUT LONG-TERM CURRENT USE OF INSULIN (HCC): ICD-10-CM

## 2022-08-31 DIAGNOSIS — I10 ESSENTIAL HYPERTENSION: Primary | ICD-10-CM

## 2022-08-31 DIAGNOSIS — E66.01 OBESITY, MORBID (HCC): ICD-10-CM

## 2022-08-31 DIAGNOSIS — E78.5 DYSLIPIDEMIA: ICD-10-CM

## 2022-08-31 DIAGNOSIS — R07.89 CHEST DISCOMFORT: ICD-10-CM

## 2022-08-31 DIAGNOSIS — G47.33 OSA (OBSTRUCTIVE SLEEP APNEA): ICD-10-CM

## 2022-08-31 PROCEDURE — 3046F HEMOGLOBIN A1C LEVEL >9.0%: CPT | Performed by: SPECIALIST

## 2022-08-31 PROCEDURE — 3017F COLORECTAL CA SCREEN DOC REV: CPT | Performed by: SPECIALIST

## 2022-08-31 PROCEDURE — G8536 NO DOC ELDER MAL SCRN: HCPCS | Performed by: SPECIALIST

## 2022-08-31 PROCEDURE — 2022F DILAT RTA XM EVC RTNOPTHY: CPT | Performed by: SPECIALIST

## 2022-08-31 PROCEDURE — 1090F PRES/ABSN URINE INCON ASSESS: CPT | Performed by: SPECIALIST

## 2022-08-31 PROCEDURE — G8432 DEP SCR NOT DOC, RNG: HCPCS | Performed by: SPECIALIST

## 2022-08-31 PROCEDURE — G0463 HOSPITAL OUTPT CLINIC VISIT: HCPCS | Performed by: SPECIALIST

## 2022-08-31 PROCEDURE — G8427 DOCREV CUR MEDS BY ELIG CLIN: HCPCS | Performed by: SPECIALIST

## 2022-08-31 PROCEDURE — G8417 CALC BMI ABV UP PARAM F/U: HCPCS | Performed by: SPECIALIST

## 2022-08-31 PROCEDURE — G8399 PT W/DXA RESULTS DOCUMENT: HCPCS | Performed by: SPECIALIST

## 2022-08-31 PROCEDURE — 99214 OFFICE O/P EST MOD 30 MIN: CPT | Performed by: SPECIALIST

## 2022-08-31 PROCEDURE — G8752 SYS BP LESS 140: HCPCS | Performed by: SPECIALIST

## 2022-08-31 PROCEDURE — 1101F PT FALLS ASSESS-DOCD LE1/YR: CPT | Performed by: SPECIALIST

## 2022-08-31 PROCEDURE — 1123F ACP DISCUSS/DSCN MKR DOCD: CPT | Performed by: SPECIALIST

## 2022-08-31 PROCEDURE — G8754 DIAS BP LESS 90: HCPCS | Performed by: SPECIALIST

## 2022-08-31 RX ORDER — LIRAGLUTIDE 6 MG/ML
0.6 INJECTION SUBCUTANEOUS DAILY
COMMUNITY

## 2022-08-31 RX ORDER — LOSARTAN POTASSIUM AND HYDROCHLOROTHIAZIDE 25; 100 MG/1; MG/1
1 TABLET ORAL DAILY
COMMUNITY

## 2022-08-31 NOTE — PROGRESS NOTES
CARDIOLOGY OFFICE NOTE    Eduardo Parry MD, [address], [place], [address] [facility]  Phone [phone]; Fax [phone]  Mobile [phone]   Voice Mail [phone]      Tad Norwood MD       ATTENTION:   This medical record was transcribed using an electronic medical records/speech recognition system. Although proofread, it may and can contain electronic, spelling and other errors. Corrections may be executed at a later time. Please feel free to contact us for any clarifications as needed. ICD-10-CM ICD-9-CM   1. Essential hypertension  I10 401.9   2. Dyslipidemia  E78.5 272.4   3. Chest discomfort  R07.89 786.59   4. Obesity, morbid ([facility] [address]  E66.01 278.01   5. Controlled type 2 diabetes mellitus without complication, without long-term current use of insulin (HCC)  E11.9 250.00   6. ISAIAH (obstructive sleep apnea)  G47.33 327.23            Ke Neumann is a 81 y.o. female with   with HTN, angina, asthma, dyslipidemia, and obesity referred for follow up. Cardiac risk factors: dyslipidemia, obesity, hypertension, post-menopausal  I have personally obtained the history from the patient. HISTORY OF PRESENTING ILLNESS     She is wearing CPAP machine. She felt a twisting sensation in her heart. Says she is doing well. She has no chest discomfort. She is motivated to lose weight because she has a trip on a cruise in June. She was not wearing her CPAP machine because it was recalled recently but her sleep doctor told her to wear it for the time being because the risk was not that great. I believe she is getting another CPAP machine. Reviewed the good carbohydrates to eat and instruct her to try eating a Mediterranean diet and reducing her carbohydrate intake.          ACTIVE PROBLEM LIST     Patient Active Problem List    Diagnosis Date Noted    Obesity, morbid ([address] 08/06/2019    HTN (hypertension) 07/06/2011    Angina pectoris ([address] 07/06/2011    Asthma 07/06/2011    Obesity 07/06/2011    Elevated cholesterol 07/06/2011    HSV-2 (herpes simplex virus 2) infection 07/06/2011    Osteopenia 07/06/2011           PAST MEDICAL HISTORY     Past Medical History:   Diagnosis Date    Acid indigestion     or heartburn    Arthritis     hand and feet    Asthma     Constipation     Diabetes (HCC)     GERD (gastroesophageal reflux disease)     Heart disease     High blood pressure     Low back pain, episodic     Multiple stiff joints     Nausea & vomiting     Shortness of breath     Sinus pressure     Sleep apnea     cpap @ hs    Sore throat     Stroke ([address]     TIA 2017    Trouble in sleeping     Wheezing            PAST SURGICAL HISTORY     Past Surgical History:   Procedure Laterality Date    BIOPSY BREAST      COLONOSCOPY N/A 5/26/2020    [facility]  05/09    AK BREAST SURGERY PROCEDURE UNLISTED      l lumpectomy, l biopsy    AK BREAST SURGERY PROCEDURE UNLISTED      r surgery to the nipple    AK CARDIAC SURG PROCEDURE UNLIST      checked for blockages          ALLERGIES     Allergies   Allergen Reactions    Lisinopril Angioedema    Pork/Porcine Containing Products Angioedema    Pcn [Penicillins] Rash          FAMILY HISTORY     Family History   Problem Relation Age of Onset    Breast Cancer Mother     Hypertension Mother     Cancer Mother     Diabetes Father     Coronary Art Dis Sister     Breast Cancer Sister     Cancer Sister     Hypertension Brother     Heart Attack Maternal Grandmother     Dementia Paternal Grandmother     Hypertension Brother     Cancer Brother         Pancreas & Liver ca    Cancer Maternal Aunt     Cancer Paternal Aunt     negative for cardiac disease       SOCIAL HISTORY     Social History     Socioeconomic History    Marital status:    Tobacco Use    Smoking status: Never    Smokeless tobacco: Never   Substance and Sexual Activity Alcohol use: Yes     Comment: rare occasions    Drug use: No    Sexual activity: Yes     Partners: Male         MEDICATIONS     Current Outpatient Medications   Medication Sig    losartan-hydroCHLOROthiazide (HYZAAR) 100-25 mg per tablet Take 1 Tablet by mouth daily. liraglutide (Victoza 2-Julio) 0.6 mg/0.1 mL (18 mg/3 mL) pnij 0.6 mg by SubCUTAneous route daily. acetaminophen (TYLENOL) 500 mg tablet Take 1,000 mg by mouth every six (6) hours as needed for Pain.    metFORMIN (GLUCOPHAGE) 500 mg tablet Take 1 Tab by mouth daily (with breakfast). allopurinoL (ZYLOPRIM) 300 mg tablet Take 300 mg by mouth daily. fexofenadine (ALLEGRA) 180 mg tablet Take 180 mg by mouth daily. dilTIAZem ER (TIAZAC) 300 mg capsule Take 300 mg by mouth daily. QUEtiapine (SEROquel) 25 mg tablet Take 50 mg by mouth nightly. nitroglycerin (NITROSTAT) 0.4 mg SL tablet 1 Tab by SubLINGual route every five (5) minutes as needed for Chest Pain.    multivitamin (ONE A DAY) tablet Take 1 Tab by mouth daily. ascorbic acid, vitamin C, (VITAMIN C) 250 mg tablet Take 250 mg by mouth. Biotin 2,500 mcg cap Take  by mouth. cyclobenzaprine (FLEXERIL) 10 mg tablet Take  by mouth three (3) times daily as needed for Muscle Spasm(s). omega 3-dha-epa-fish oil 100-160-1,000 mg cap Take  by mouth. carvedilol (COREG) 25 mg tablet Take 25 mg by mouth two (2) times daily (with meals). omeprazole (PRILOSEC) 20 mg capsule Take 20 mg by mouth daily. simvastatin (ZOCOR) 10 mg tablet Take 20 mg by mouth nightly. potassium chloride SR (KLOR-CON 10) 10 mEq tablet Take 10 mEq by mouth two (2) times a day. cholecalciferol (VITAMIN D3) 25 mcg (1,000 unit) cap Take 2,000 Units by mouth. aspirin 81 mg tablet Take 162 mg by mouth daily. No current facility-administered medications for this visit.        I have reviewed the nurses notes, vitals, problem list, allergy list, medical history, family, social history and medications. REVIEW OF SYMPTOMS   Pertinent positives per HPI  General: Pt denies excessive weight gain or loss. Pt is able to conduct ADL's  HEENT: Denies blurred vision, headaches, hearing loss, epistaxis and difficulty swallowing. Respiratory: Denies cough, congestion, shortness of breath, BERMUDEZ, wheezing or stridor. Cardiovascular: Denies precordial pain, palpitations, edema or PND  Gastrointestinal: Denies poor appetite, indigestion, abdominal pain or blood in stool  Genitourinary: Denies hematuria, dysuria, increased urinary frequency  Musculoskeletal: Denies joint pain or swelling from muscles or joints  Neurologic: Denies tremor, paresthesias, headache, or sensory motor disturbance  Psychiatric: Denies confusion, insomnia, depression  Integumentray: Denies rash, itching or ulcers. Hematologic: Denies easy bruising, bleeding     PHYSICAL EXAMINATION      Vitals:    08/31/22 1257 08/31/22 1307   BP: (!) 145/70 130/80   Pulse: 76    Weight: 240 lb (108.9 kg)    Height: 5' 2\" (1.575 m)      General: Well developed, in no acute distress. HEENT: No jaundice, oral mucosa moist, no oral ulcers  Neck: Supple, no stiffness, no lymphadenopathy, supple  Heart:  Normal S1/S2 negative S3 or S4. Regular, no murmur, gallop or rub, no jugular venous distention  Respiratory: Clear bilaterally x 4, no wheezing or rales  Extremities:  No edema, normal cap refill, no cyanosis. Musculoskeletal: No clubbing, no deformities  Neuro: A&Ox3, speech clear, gait stable, cooperative, no focal neurologic deficits  Skin: Skin color is normal. No rashes or lesions. Non diaphoretic, moist.           DIAGNOSTIC DATA     1. Stress Test   3/7/13- NM stress- no ischemia, EF 68%   10/22/18-Cardiolite-no ischemia, EF 75%   8/17/20-Lexiscan-Myocardial perfusion imaging defect 1: There is a defect that is small to moderate in size present in the basal-apical anterior location(s) that is partially reversible.  The defect appears to probably be artifact caused by breast attenuation. Defect improves with prone stress imaging. The possibility of infarction cannot be excluded. 2. Echo   2/25/13- EF 55%, MV mild annular calcification, TR mild, PAP 35 mmHg     3. LE Doppler   5/10/18- No evidence of right lower extremity vein thrombosis. 4. Cholesterol   9/5/18: , HDL 45, LDL 83, .   6/5/19- , HDL 42, LDL 79,    10/14/20- , HDL 42, LDL 92,   4/15/21- , HDL 43, LDL 75,   3/29/22- , HDL 42, LDL 78, TG 80    5. Coronary CT angio/Ca++ scoring   (6/24/19): 1. Left main originate normally from left coronary cusp and is normal size   without any significant atherosclerotic plaque or calcification. 2. The LAD is normal size with significant tortuosity. There is a mild soft   plaque in the mid LAD without any significant stenosis. There is no significant   calcification. The D1 and D2 diagonal branches were seen without any significant   lesion or calcification. 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 3. The left circumflex is small size vessel with significant tortuosity. There   is no significant lesion or calcification. The OM1 branch is very small without   any significant disease. 4. The RCA is normal size vessel and originate normally from the right coronary   cusp. The RCA is highly tortuous. The mid RCA demonstrate mild soft plaque   without any significant luminal stenosis. CALCIUM SCORE =0     6.  Cardiac Cath   10/7/20-No coronary artery disease detected however there are some tortuosity to her vessels possibly secondary to hypertension         LABORATORY DATA            Lab Results   Component Value Date/Time    WBC 11.6 (H) 11/29/2020 01:56 PM    HGB 13.5 11/29/2020 01:56 PM    HCT 40.9 11/29/2020 01:56 PM    PLATELET 806 88/00/4235 01:56 PM    MCV 90.9 11/29/2020 01:56 PM      Lab Results   Component Value Date/Time    Sodium 144 11/29/2020 01:56 PM    Potassium 3.6 11/29/2020 01:56 PM    Chloride 106 2020 01:56 PM    CO2 29 2020 01:56 PM    Anion gap 9 2020 01:56 PM    Glucose 98 2020 01:56 PM    BUN 15 2020 01:56 PM    Creatinine 0.89 2020 01:56 PM    BUN/Creatinine ratio 17 2020 01:56 PM    GFR est AA >60 2020 01:56 PM    GFR est non-AA >60 2020 01:56 PM    Calcium 9.7 2020 01:56 PM    Bilirubin, total 0.4 2020 11:23 AM    Alk. phosphatase 83 2020 11:23 AM    Protein, total 6.7 2020 11:23 AM    Albumin 4.4 2020 11:23 AM    Globulin 3.8 2019 12:25 PM    A-G Ratio 1.0 (L) 2019 12:25 PM    ALT (SGPT) 23 2020 11:23 AM           ASSESSMENT/RECOMMENDATIONS:.       1. HTN  - Blood pressure is at goal on current medical regimen  2. Dyslipidemia  -LDL is 78 and this was checked in 2022  3. Chest discomfort  -Calcium score of 0   -Negative negative coronary CT angiogram  -Normal coronaries cardiac catheterization in   4. ISAIAH  -Wears her CPAP on a regular basis  5. Diabetes   -Hemoglobin A1c should be under 7  6. Obesity  -she has lost 25 lbs in less than a year and I think she feels is related to the addition of Victoza to her medical regimen      Follow-up with me in 6 months. Orders Placed This Encounter    losartan-hydroCHLOROthiazide (HYZAAR) 100-25 mg per tablet     Sig: Take 1 Tablet by mouth daily. liraglutide (Victoza 2-Julio) 0.6 mg/0.1 mL (18 mg/3 mL) pnij     Si.6 mg by SubCUTAneous route daily. We discussed the expected course, resolution and complications of the diagnosis(es) in detail. Medication risks, benefits, costs, interactions, and alternatives were discussed as indicated. I advised him to contact the office if his condition worsens, changes or fails to improve as anticipated. He expressed understanding with the diagnosis(es) and plan          Follow-up and Dispositions    Return in about 6 months (around 2023).            I have discussed the diagnosis with  German Galarza and the intended plan as seen in the above orders. Questions were answered concerning future plans. I have discussed medication side effects and warnings with the patient as well. Thank you,  Olu Baker MD for involving me in the care of  German Galarza. Please do not hesitate to contact me for further questions/concerns. Eduardo Davidson MD, [facility] [address]      [facility], [address] [place] [address] Drive      [phone] / [phone] Fax

## 2023-10-13 ENCOUNTER — TELEPHONE (OUTPATIENT)
Age: 73
End: 2023-10-13

## 2023-10-13 RX ORDER — METOPROLOL SUCCINATE 50 MG/1
50 TABLET, EXTENDED RELEASE ORAL DAILY
Qty: 30 TABLET | Refills: 5 | Status: SHIPPED | OUTPATIENT
Start: 2023-10-13 | End: 2023-11-20 | Stop reason: SDUPTHER

## 2023-10-13 RX ORDER — METOPROLOL SUCCINATE 50 MG/1
50 TABLET, EXTENDED RELEASE ORAL DAILY
COMMUNITY
End: 2023-10-13 | Stop reason: SDUPTHER

## 2023-10-13 NOTE — TELEPHONE ENCOUNTER
MD Roxanne James LPN  Caller: Unspecified (Today, 11:24 AM)  We can try stopping her diltiazem and switching her Coreg over to metoprolol XL 50 mg a day and she needs to follow her blood pressures and her heart rate closely. She is seeing a NP approximately 3 weeks.

## 2023-10-13 NOTE — TELEPHONE ENCOUNTER
Pt takes Diltiazem 300 mg daily. PCP received a call from Dr Mg groves ([phone]) Dentist. He is concerned that this medication is causing pt to have gingivitis and wanted to know if another medication can be used.

## 2023-10-13 NOTE — TELEPHONE ENCOUNTER
is calling because the dentist office called her about the patient taking diltiazem  and is causing her gingival disease. The doctor said this is out of her scope.     [phone]

## 2023-11-15 NOTE — TELEPHONE ENCOUNTER
Patient called requesting 90 day supply of Metoprolol prescription instead of 30 day supply    Patient# [phone]

## 2023-11-20 ENCOUNTER — APPOINTMENT (OUTPATIENT)
Facility: HOSPITAL | Age: 73
End: 2023-11-20
Payer: MEDICARE

## 2023-11-20 ENCOUNTER — HOSPITAL ENCOUNTER (EMERGENCY)
Facility: HOSPITAL | Age: 73
Discharge: HOME | End: 2023-11-20
Attending: EMERGENCY MEDICINE
Payer: MEDICARE

## 2023-11-20 VITALS
WEIGHT: 244.27 LBS | DIASTOLIC BLOOD PRESSURE: 81 MMHG | BODY MASS INDEX: 43.28 KG/M2 | HEART RATE: 67 BPM | SYSTOLIC BLOOD PRESSURE: 183 MMHG | OXYGEN SATURATION: 98 % | TEMPERATURE: 98 F | RESPIRATION RATE: 12 BRPM | HEIGHT: 63 IN

## 2023-11-20 DIAGNOSIS — I10 ESSENTIAL HYPERTENSION: Primary | ICD-10-CM

## 2023-11-20 DIAGNOSIS — G44.209 TENSION HEADACHE: ICD-10-CM

## 2023-11-20 PROCEDURE — 96375 TX/PRO/DX INJ NEW DRUG ADDON: CPT

## 2023-11-20 PROCEDURE — 6370000000 HC RX 637 (ALT 250 FOR IP): Performed by: EMERGENCY MEDICINE

## 2023-11-20 PROCEDURE — 2580000003 HC RX 258: Performed by: EMERGENCY MEDICINE

## 2023-11-20 PROCEDURE — 96374 THER/PROPH/DIAG INJ IV PUSH: CPT

## 2023-11-20 PROCEDURE — 6360000002 HC RX W HCPCS: Performed by: EMERGENCY MEDICINE

## 2023-11-20 PROCEDURE — 99284 EMERGENCY DEPT VISIT MOD MDM: CPT

## 2023-11-20 PROCEDURE — 70450 CT HEAD/BRAIN W/O DYE: CPT

## 2023-11-20 RX ORDER — MELOXICAM 15 MG/1
15 TABLET ORAL DAILY
COMMUNITY

## 2023-11-20 RX ORDER — IBUPROFEN 600 MG/1
600 TABLET ORAL EVERY 6 HOURS PRN
Qty: 20 TABLET | Refills: 0 | Status: SHIPPED | OUTPATIENT
Start: 2023-11-20

## 2023-11-20 RX ORDER — ALBUTEROL SULFATE 90 UG/1
2 AEROSOL, METERED RESPIRATORY (INHALATION) EVERY 6 HOURS PRN
COMMUNITY

## 2023-11-20 RX ORDER — ACETAMINOPHEN 500 MG
1000 TABLET ORAL EVERY 6 HOURS PRN
Qty: 40 TABLET | Refills: 0 | Status: SHIPPED | OUTPATIENT
Start: 2023-11-20

## 2023-11-20 RX ORDER — DIPHENHYDRAMINE HYDROCHLORIDE 50 MG/ML
25 INJECTION INTRAMUSCULAR; INTRAVENOUS
Status: COMPLETED | OUTPATIENT
Start: 2023-11-20 | End: 2023-11-20

## 2023-11-20 RX ORDER — METOPROLOL SUCCINATE 50 MG/1
50 TABLET, EXTENDED RELEASE ORAL DAILY
Qty: 90 TABLET | Refills: 0 | Status: SHIPPED | OUTPATIENT
Start: 2023-11-20

## 2023-11-20 RX ORDER — METOCLOPRAMIDE HYDROCHLORIDE 5 MG/ML
10 INJECTION INTRAMUSCULAR; INTRAVENOUS ONCE
Status: COMPLETED | OUTPATIENT
Start: 2023-11-20 | End: 2023-11-20

## 2023-11-20 RX ORDER — IBUPROFEN 400 MG/1
400 TABLET ORAL
Status: COMPLETED | OUTPATIENT
Start: 2023-11-20 | End: 2023-11-20

## 2023-11-20 RX ORDER — ACETAMINOPHEN 500 MG
1000 TABLET ORAL
Status: COMPLETED | OUTPATIENT
Start: 2023-11-20 | End: 2023-11-20

## 2023-11-20 RX ORDER — ASPIRIN 81 MG/1
81 TABLET ORAL DAILY
COMMUNITY

## 2023-11-20 RX ORDER — 0.9 % SODIUM CHLORIDE 0.9 %
1000 INTRAVENOUS SOLUTION INTRAVENOUS ONCE
Status: COMPLETED | OUTPATIENT
Start: 2023-11-20 | End: 2023-11-20

## 2023-11-20 RX ADMIN — SODIUM CHLORIDE 1000 ML: 9 INJECTION, SOLUTION INTRAVENOUS at 19:54

## 2023-11-20 RX ADMIN — IBUPROFEN 400 MG: 400 TABLET ORAL at 18:16

## 2023-11-20 RX ADMIN — DIPHENHYDRAMINE HYDROCHLORIDE 25 MG: 50 INJECTION INTRAMUSCULAR; INTRAVENOUS at 19:54

## 2023-11-20 RX ADMIN — ACETAMINOPHEN 1000 MG: 500 TABLET ORAL at 18:16

## 2023-11-20 RX ADMIN — METOCLOPRAMIDE 10 MG: 5 INJECTION, SOLUTION INTRAMUSCULAR; INTRAVENOUS at 19:54

## 2023-11-20 ASSESSMENT — PAIN DESCRIPTION - LOCATION
LOCATION: HEAD

## 2023-11-20 ASSESSMENT — PAIN SCALES - GENERAL
PAINLEVEL_OUTOF10: 6
PAINLEVEL_OUTOF10: 3
PAINLEVEL_OUTOF10: 5

## 2023-11-20 ASSESSMENT — PAIN DESCRIPTION - DESCRIPTORS
DESCRIPTORS: ACHING

## 2023-11-20 ASSESSMENT — PAIN - FUNCTIONAL ASSESSMENT: PAIN_FUNCTIONAL_ASSESSMENT: 0-10

## 2023-11-20 ASSESSMENT — LIFESTYLE VARIABLES: HOW OFTEN DO YOU HAVE A DRINK CONTAINING ALCOHOL: NEVER

## 2023-11-20 NOTE — ED TRIAGE NOTES
Patient presents ambulatory to treatment area with a steady gait. Patient complains of nausea, hypertension, and severe headache that began earlier this morning.

## 2023-11-21 NOTE — ED NOTES
Pt discharged to home in [place], states understanding of dc instructions and follow up, rx x 2 sent; pt's  here to drive her home     Madeleine Priset, [employer]  11/20/23 7116

## 2023-11-22 NOTE — ED PROVIDER NOTES
this dictation. EMERGENCY DEPARTMENT COURSE and DIFFERENTIAL DIAGNOSIS/MDM:   Vitals:    Vitals:    11/20/23 1900 11/20/23 2020 11/20/23 2030 11/20/23 2103   BP: (!) 192/81 (!) 170/76 (!) 172/81 (!) 183/81   Pulse:       Resp:       Temp:       TempSrc:       SpO2: 98%  99% 98%   Weight:       Height:               Medical Decision Making  C6-year-old female presents with a persistent headache that occurred today. She is concerned about her blood pressure but there is no evidence of endorgan damage clinically. She was given ibuprofen and Tylenol did not have much relief in her symptoms of CT head ordered to evaluate for spontaneous ICH. No signs of bleeding on CT. Provided Reglan and Benadryl with improvement of symptoms and blood pressure is downtrending without acute treatment/suspect this is a secondary feature of her pain rather than the cause. Discussed follow-up with primary care physician in the next few weeks if her blood pressure is not returning to normal to consider uptitrating her metoprolol as tolerated by her heart rate. We discussed why changes to blood pressure medications are not advisable from the emergency department without appropriate follow-up. Return precautions were discussed for worsening or new concerning symptoms. Problems Addressed:  Essential hypertension: chronic illness or injury with exacerbation, progression, or side effects of treatment  Tension headache: acute illness or injury    Amount and/or Complexity of Data Reviewed  Independent Historian: spouse  Radiology: ordered and independent interpretation performed. Decision-making details documented in ED Course. Risk  OTC drugs. Prescription drug management. REASSESSMENT            CONSULTS:  None    PROCEDURES:  Unless otherwise noted below, none     Procedures      FINAL IMPRESSION      1. Essential hypertension    2.  Tension headache          DISPOSITION/PLAN   DISPOSITION Decision To Discharge

## 2024-02-14 ENCOUNTER — OFFICE VISIT (OUTPATIENT)
Age: 74
End: 2024-02-14
Payer: MEDICARE

## 2024-02-14 VITALS
BODY MASS INDEX: 43.05 KG/M2 | OXYGEN SATURATION: 96 % | HEIGHT: 63 IN | WEIGHT: 243 LBS | SYSTOLIC BLOOD PRESSURE: 132 MMHG | RESPIRATION RATE: 18 BRPM | DIASTOLIC BLOOD PRESSURE: 64 MMHG | HEART RATE: 86 BPM

## 2024-02-14 DIAGNOSIS — E11.9 TYPE 2 DIABETES MELLITUS WITHOUT COMPLICATION, WITHOUT LONG-TERM CURRENT USE OF INSULIN (HCC): ICD-10-CM

## 2024-02-14 DIAGNOSIS — G47.33 OBSTRUCTIVE SLEEP APNEA (ADULT) (PEDIATRIC): ICD-10-CM

## 2024-02-14 DIAGNOSIS — I10 ESSENTIAL (PRIMARY) HYPERTENSION: Primary | ICD-10-CM

## 2024-02-14 DIAGNOSIS — E66.01 MORBID (SEVERE) OBESITY DUE TO EXCESS CALORIES (HCC): ICD-10-CM

## 2024-02-14 DIAGNOSIS — E78.2 MIXED HYPERLIPIDEMIA: ICD-10-CM

## 2024-02-14 PROCEDURE — 2022F DILAT RTA XM EVC RTNOPTHY: CPT | Performed by: SPECIALIST

## 2024-02-14 PROCEDURE — 3017F COLORECTAL CA SCREEN DOC REV: CPT | Performed by: SPECIALIST

## 2024-02-14 PROCEDURE — G8484 FLU IMMUNIZE NO ADMIN: HCPCS | Performed by: SPECIALIST

## 2024-02-14 PROCEDURE — G8399 PT W/DXA RESULTS DOCUMENT: HCPCS | Performed by: SPECIALIST

## 2024-02-14 PROCEDURE — 93005 ELECTROCARDIOGRAM TRACING: CPT | Performed by: SPECIALIST

## 2024-02-14 PROCEDURE — G8417 CALC BMI ABV UP PARAM F/U: HCPCS | Performed by: SPECIALIST

## 2024-02-14 PROCEDURE — 93010 ELECTROCARDIOGRAM REPORT: CPT | Performed by: SPECIALIST

## 2024-02-14 PROCEDURE — 1123F ACP DISCUSS/DSCN MKR DOCD: CPT | Performed by: SPECIALIST

## 2024-02-14 PROCEDURE — G8427 DOCREV CUR MEDS BY ELIG CLIN: HCPCS | Performed by: SPECIALIST

## 2024-02-14 PROCEDURE — 3046F HEMOGLOBIN A1C LEVEL >9.0%: CPT | Performed by: SPECIALIST

## 2024-02-14 PROCEDURE — 3078F DIAST BP <80 MM HG: CPT | Performed by: SPECIALIST

## 2024-02-14 PROCEDURE — 99214 OFFICE O/P EST MOD 30 MIN: CPT | Performed by: SPECIALIST

## 2024-02-14 PROCEDURE — 1036F TOBACCO NON-USER: CPT | Performed by: SPECIALIST

## 2024-02-14 PROCEDURE — 3075F SYST BP GE 130 - 139MM HG: CPT | Performed by: SPECIALIST

## 2024-02-14 PROCEDURE — 1090F PRES/ABSN URINE INCON ASSESS: CPT | Performed by: SPECIALIST

## 2024-02-14 NOTE — PROGRESS NOTES
had concerns including Hypertension and Cholesterol Problem.    Vitals:    24 1556   BP: 132/64   Site: Left Upper Arm   Position: Sitting   Pulse: 86   Resp: 18   SpO2: 96%   Weight: 110.2 kg (243 lb)   Height: 1.6 m (5' 3\")        Chest pain No    Refills No        1. Have you been to the ER, urgent care clinic since your last visit? No       Hospitalized since your last visit? No       Where?        Date?    NAME Lyla Sotelo         1950      MRN    [id number]      LAST OFFICE APPOINTMENT: 2022     DIAGNOSIS    ICD-10-CM    1. Essential (primary) hypertension  I10 EKG 12 Lead          HOME MEDICATION  Current Outpatient Medications   Medication Sig    metoprolol succinate (TOPROL XL) 50 MG extended release tablet Take 1 tablet by mouth daily    albuterol sulfate HFA (VENTOLIN HFA) 108 (90 Base) MCG/ACT inhaler Inhale 2 puffs into the lungs every 6 hours as needed for Wheezing    aspirin 81 MG EC tablet Take 1 tablet by mouth daily Two tablets daily    acetaminophen (TYLENOL) 500 MG tablet Take 2 tablets by mouth every 6 hours as needed for Pain or Fever    ibuprofen (ADVIL;MOTRIN) 600 MG tablet Take 1 tablet by mouth every 6 hours as needed for Pain or Fever    meloxicam (MOBIC) 15 MG tablet Take 1 tablet by mouth as needed for Pain    allopurinol (ZYLOPRIM) 300 MG tablet Take 1 tablet by mouth daily    ascorbic acid (VITAMIN C) 250 MG tablet Take 1 tablet by mouth    Biotin 2.5 MG CAPS Take by mouth    vitamin D 25 MCG (1000 UT) CAPS Take 2 capsules by mouth    cyclobenzaprine (FLEXERIL) 10 MG tablet Take by mouth 3 times daily as needed    fexofenadine (ALLEGRA) 180 MG tablet Take 1 tablet by mouth daily    Liraglutide (VICTOZA) 18 MG/3ML SOPN SC injection Inject 0.6 mg into the skin daily    losartan-hydroCHLOROthiazide (HYZAAR) 100-25 MG per tablet Take 1 tablet by mouth daily    nitroGLYCERIN (NITROSTAT) 0.4 MG SL tablet Place 1 tablet under the tongue    omeprazole (PRILOSEC) 20 MG

## 2024-02-14 NOTE — PROGRESS NOTES
CARDIOLOGY OFFICE NOTE    Barak White MD, FAC    [address], [place] [address]  Phone [phone]; Fax [phone]  Mobile [phone]   Voice Mail [phone]    Primary care: Mimi Cortez MD       ATTENTION:   This medical record was transcribed using an electronic medical records/speech recognition system.  Although proofread, it may and can contain electronic, spelling and other errors.  Corrections may be executed at a later time.  Please feel free to contact us for any clarifications as needed.             Lyla Sotelo is a 73 y.o. female with  referred for  HTN, angina, asthma, dyslipidemia, and obesity referred for follow up.        Cardiac risk factors: dyslipidemia, obesity, hypertension, post-menopausal   I have personally obtained the history from the patient.            HISTORY OF PRESENTING ILLNESS    Ms./Mr. Lyla Sotelo  73 y.o. is here for follow-up of hypertension and dyslipidemia.  She is wearing her CPAP machine regularly.  She denies any chest pain or shortness of breath.  She does have a history of a calcium score of 0 in 2019 and was in 2020 with no coronary artery disease.  She is starting exercise and going to a gym class once a week and is starting slowly         ACTIVE PROBLEM LIST     Patient Active Problem List    Diagnosis Date Noted    Obesity, morbid (HCC) 08/06/2019    Asthma 07/06/2011    Angina pectoris (HCC) 07/06/2011    Obesity 07/06/2011    Elevated cholesterol 07/06/2011    HTN (hypertension) 07/06/2011    Osteopenia 07/06/2011    HSV-2 (herpes simplex virus 2) infection 07/06/2011           PAST MEDICAL HISTORY     Past Medical History:   Diagnosis Date    Acid indigestion     or heartburn    Arthritis     hand and feet    Asthma     Constipation     Diabetes (HCC)     GERD (gastroesophageal reflux disease)     Heart disease     High blood pressure     Low back pain, episodic     Multiple stiff joints     Nausea &

## 2024-02-14 NOTE — PATIENT INSTRUCTIONS

## 2024-03-04 RX ORDER — METOPROLOL SUCCINATE 50 MG/1
TABLET, EXTENDED RELEASE ORAL
Qty: 90 TABLET | Refills: 2 | Status: SHIPPED | OUTPATIENT
Start: 2024-03-04

## 2024-06-10 ENCOUNTER — APPOINTMENT (OUTPATIENT)
Facility: HOSPITAL | Age: 74
End: 2024-06-10
Payer: MEDICARE

## 2024-06-10 ENCOUNTER — HOSPITAL ENCOUNTER (EMERGENCY)
Facility: HOSPITAL | Age: 74
Discharge: HOME | End: 2024-06-10
Attending: STUDENT IN AN ORGANIZED HEALTH CARE EDUCATION/TRAINING PROGRAM
Payer: MEDICARE

## 2024-06-10 VITALS
OXYGEN SATURATION: 98 % | WEIGHT: 252.43 LBS | TEMPERATURE: 98.1 F | HEIGHT: 63 IN | DIASTOLIC BLOOD PRESSURE: 93 MMHG | SYSTOLIC BLOOD PRESSURE: 216 MMHG | RESPIRATION RATE: 18 BRPM | BODY MASS INDEX: 44.73 KG/M2 | HEART RATE: 76 BPM

## 2024-06-10 DIAGNOSIS — I10 ESSENTIAL HYPERTENSION: Primary | ICD-10-CM

## 2024-06-10 DIAGNOSIS — R68.84 JAW PAIN: ICD-10-CM

## 2024-06-10 LAB
ALBUMIN SERPL-MCNC: 3.5 G/DL (ref 3.5–5)
ALBUMIN/GLOB SERPL: 0.9 (ref 1.1–2.2)
ALP SERPL-CCNC: 105 U/L (ref 45–117)
ALT SERPL-CCNC: 25 U/L (ref 12–78)
ANION GAP SERPL CALC-SCNC: 6 MMOL/L (ref 5–15)
AST SERPL-CCNC: 16 U/L (ref 15–37)
BASOPHILS # BLD: 0.1 K/UL (ref 0–0.1)
BASOPHILS NFR BLD: 1 % (ref 0–1)
BILIRUB SERPL-MCNC: 0.5 MG/DL (ref 0.2–1)
BUN/CREAT SERPL: 19 (ref 12–20)
CALCIUM SERPL-MCNC: 9.2 MG/DL (ref 8.5–10.1)
CHLORIDE SERPL-SCNC: 106 MMOL/L (ref 97–108)
CO2 SERPL-SCNC: 31 MMOL/L (ref 21–32)
CREAT SERPL-MCNC: 0.96 MG/DL (ref 0.55–1.02)
DIFFERENTIAL METHOD BLD: ABNORMAL
EOSINOPHIL # BLD: 0.5 K/UL (ref 0–0.4)
EOSINOPHIL NFR BLD: 6 % (ref 0–7)
ERYTHROCYTE [DISTWIDTH] IN BLOOD BY AUTOMATED COUNT: 13.5 % (ref 11.5–14.5)
GLOBULIN SER CALC-MCNC: 3.7 G/DL (ref 2–4)
GLUCOSE SERPL-MCNC: 131 MG/DL (ref 65–100)
HCT VFR BLD AUTO: 38.9 % (ref 35–47)
HGB BLD-MCNC: 13.3 G/DL (ref 11.5–16)
IMM GRANULOCYTES # BLD AUTO: 0 K/UL (ref 0–0.04)
IMM GRANULOCYTES NFR BLD AUTO: 0 % (ref 0–0.5)
LYMPHOCYTES # BLD: 3.4 K/UL (ref 0.8–3.5)
LYMPHOCYTES NFR BLD: 38 % (ref 12–49)
MAGNESIUM SERPL-MCNC: 1.7 MG/DL (ref 1.6–2.4)
MCH RBC QN AUTO: 30.9 PG (ref 26–34)
MCHC RBC AUTO-ENTMCNC: 34.2 G/DL (ref 30–36.5)
MCV RBC AUTO: 90.3 FL (ref 80–99)
MONOCYTES # BLD: 0.8 K/UL (ref 0–1)
MONOCYTES NFR BLD: 9 % (ref 5–13)
NEUTS SEG # BLD: 4.2 K/UL (ref 1.8–8)
NEUTS SEG NFR BLD: 46 % (ref 32–75)
NRBC # BLD: 0 K/UL (ref 0–0.01)
NRBC BLD-RTO: 0 PER 100 WBC
PLATELET # BLD AUTO: 279 K/UL (ref 150–400)
PMV BLD AUTO: 10.6 FL (ref 8.9–12.9)
POTASSIUM SERPL-SCNC: 3.2 MMOL/L (ref 3.5–5.1)
PROT SERPL-MCNC: 7.2 G/DL (ref 6.4–8.2)
RBC # BLD AUTO: 4.31 M/UL (ref 3.8–5.2)
SODIUM SERPL-SCNC: 143 MMOL/L (ref 136–145)
TROPONIN I SERPL HS-MCNC: 12 NG/L (ref 0–51)
WBC # BLD AUTO: 8.9 K/UL (ref 3.6–11)

## 2024-06-10 PROCEDURE — 80053 COMPREHEN METABOLIC PANEL: CPT

## 2024-06-10 PROCEDURE — 83735 ASSAY OF MAGNESIUM: CPT

## 2024-06-10 PROCEDURE — 6370000000 HC RX 637 (ALT 250 FOR IP)

## 2024-06-10 PROCEDURE — 85025 COMPLETE CBC W/AUTO DIFF WBC: CPT

## 2024-06-10 PROCEDURE — 71046 X-RAY EXAM CHEST 2 VIEWS: CPT

## 2024-06-10 PROCEDURE — 36415 COLL VENOUS BLD VENIPUNCTURE: CPT

## 2024-06-10 PROCEDURE — 93005 ELECTROCARDIOGRAM TRACING: CPT

## 2024-06-10 PROCEDURE — 84484 ASSAY OF TROPONIN QUANT: CPT

## 2024-06-10 PROCEDURE — 99285 EMERGENCY DEPT VISIT HI MDM: CPT

## 2024-06-10 RX ORDER — AMLODIPINE BESYLATE 5 MG/1
5 TABLET ORAL
Status: COMPLETED | OUTPATIENT
Start: 2024-06-10 | End: 2024-06-10

## 2024-06-10 RX ORDER — CARVEDILOL 25 MG/1
25 TABLET ORAL 2 TIMES DAILY WITH MEALS
COMMUNITY

## 2024-06-10 RX ORDER — AMLODIPINE BESYLATE 5 MG/1
5 TABLET ORAL DAILY
Qty: 30 TABLET | Refills: 0 | Status: SHIPPED | OUTPATIENT
Start: 2024-06-10

## 2024-06-10 RX ORDER — POTASSIUM CHLORIDE 750 MG/1
40 TABLET, FILM COATED, EXTENDED RELEASE ORAL ONCE
Status: COMPLETED | OUTPATIENT
Start: 2024-06-10 | End: 2024-06-10

## 2024-06-10 RX ADMIN — AMLODIPINE BESYLATE 5 MG: 5 TABLET ORAL at 18:55

## 2024-06-10 RX ADMIN — POTASSIUM CHLORIDE 40 MEQ: 750 TABLET, FILM COATED, EXTENDED RELEASE ORAL at 18:09

## 2024-06-10 ASSESSMENT — PAIN DESCRIPTION - LOCATION: LOCATION: CHEST

## 2024-06-10 ASSESSMENT — PAIN SCALES - GENERAL: PAINLEVEL_OUTOF10: 2

## 2024-06-10 ASSESSMENT — PAIN DESCRIPTION - ORIENTATION: ORIENTATION: MID

## 2024-06-10 ASSESSMENT — PAIN DESCRIPTION - DESCRIPTORS: DESCRIPTORS: PINS AND NEEDLES

## 2024-06-10 NOTE — ED PROVIDER NOTES
[facility] EMERGENCY DEPT  EMERGENCY DEPARTMENT ENCOUNTER      Pt Name: Lyla Sotelo  MRN: [id number]  Birthdate 1950  Date of evaluation: 6/10/2024  Provider: Acacia Gillespie PA-C    CHIEF COMPLAINT       Chief Complaint   Patient presents with    Hypertension         HISTORY OF PRESENT ILLNESS   (Location/Symptom, Timing/Onset, Context/Setting, Quality, Duration, Modifying Factors, Severity)  Note limiting factors.   Lyla Sotelo is a 73 y.o. female with history of  has a past medical history of Acid indigestion, Arthritis, Asthma, Constipation, Diabetes ([facility]), GERD (gastroesophageal reflux disease), Heart disease, High blood pressure, Low back pain, episodic, Multiple stiff joints, Nausea & vomiting, Shortness of breath, Sinus pressure, Sleep apnea, Sore throat, Stroke ([facility]), Trouble in sleeping, and Wheezing. who presents from home to [place] ED with cc of elevated blood pressure x 2 weeks.  Patient patient recently stopped taking metoprolol and began carvedilol on May 29 and has had elevated blood pressure since.  She reports mild intermittent headaches.  She has not had any vision changes, dizziness, gait disturbance, extremity numbness or weakness, facial asymmetry, speech changes throughout the past 2 weeks.  Patient reports current jaw pain.  She does note she has had intermittent sharp chest pains.  No current chest pain or shortness of breath.  She has not spoken to her primary care provider regarding the continued elevated blood pressures after switching medications.            PCP: Mimi Cortez MD    There are no other complaints, changes or physical findings at this time.        The history is provided by the patient and the spouse.         Review of External Medical Records:     Nursing Notes were reviewed.    REVIEW OF SYSTEMS    (2-9 systems for level 4, 10 or more for level 5)     Review of Systems   Cardiovascular:  Positive for chest pain.       Except as noted above the remainder

## 2024-06-10 NOTE — DISCHARGE INSTRUCTIONS
You were seen today in the emergency department for elevated blood pressure.  Begin taking amlodipine and follow-up closely with your primary care provider.  Return to the emergency department if you have any new or worsening symptoms.

## 2024-06-10 NOTE — ED TRIAGE NOTES
GCS 15. Patient ambulatory to treatment area. Patient states that her BP has been monitoring her BP and highest it has been in 203/113.  Patient does take BP medication and has taken it as prescribed.  Patient states that she recently had a change in BP meds from Metoprolol to Carvedilol (May 29)

## 2024-06-10 NOTE — ED NOTES
The patient was discharged home by Dr Hernandez  in stable condition. The patient is alert and oriented, in no respiratory distress and discharge vital signs obtained. The patient's diagnosis, condition and treatment were explained. The patient expressed understanding. No prescriptions given/e-scribed to pharmacy. No work/school note given. A discharge plan has been developed. A  was not involved in the process. Aftercare instructions were given.  Pt ambulatory out of the ED with family.

## 2024-06-11 ASSESSMENT — HEART SCORE: ECG: NORMAL

## 2024-06-12 LAB
EKG ATRIAL RATE: 63 BPM
EKG DIAGNOSIS: NORMAL
EKG P AXIS: 59 DEGREES
EKG P-R INTERVAL: 178 MS
EKG Q-T INTERVAL: 402 MS
EKG QRS DURATION: 90 MS
EKG QTC CALCULATION (BAZETT): 411 MS
EKG R AXIS: -11 DEGREES
EKG T AXIS: 91 DEGREES
EKG VENTRICULAR RATE: 63 BPM

## 2024-06-12 PROCEDURE — 93010 ELECTROCARDIOGRAM REPORT: CPT | Performed by: SPECIALIST

## 2024-07-12 ENCOUNTER — OFFICE VISIT (OUTPATIENT)
Age: 74
End: 2024-07-12
Payer: MEDICARE

## 2024-07-12 VITALS
WEIGHT: 251 LBS | HEIGHT: 63 IN | RESPIRATION RATE: 18 BRPM | SYSTOLIC BLOOD PRESSURE: 128 MMHG | BODY MASS INDEX: 44.47 KG/M2 | OXYGEN SATURATION: 97 % | DIASTOLIC BLOOD PRESSURE: 60 MMHG | HEART RATE: 86 BPM

## 2024-07-12 DIAGNOSIS — E78.2 MIXED HYPERLIPIDEMIA: ICD-10-CM

## 2024-07-12 DIAGNOSIS — E11.9 TYPE 2 DIABETES MELLITUS WITHOUT COMPLICATION, WITHOUT LONG-TERM CURRENT USE OF INSULIN (HCC): ICD-10-CM

## 2024-07-12 DIAGNOSIS — R07.89 OTHER CHEST PAIN: ICD-10-CM

## 2024-07-12 DIAGNOSIS — I10 ESSENTIAL (PRIMARY) HYPERTENSION: Primary | ICD-10-CM

## 2024-07-12 DIAGNOSIS — E66.01 MORBID (SEVERE) OBESITY DUE TO EXCESS CALORIES (HCC): ICD-10-CM

## 2024-07-12 DIAGNOSIS — G47.33 OBSTRUCTIVE SLEEP APNEA (ADULT) (PEDIATRIC): ICD-10-CM

## 2024-07-12 DIAGNOSIS — Z01.818 PRE-OP EVALUATION: ICD-10-CM

## 2024-07-12 DIAGNOSIS — I10 ESSENTIAL (PRIMARY) HYPERTENSION: ICD-10-CM

## 2024-07-12 LAB
ANION GAP SERPL CALC-SCNC: 5 MMOL/L (ref 5–15)
BUN SERPL-MCNC: 23 MG/DL (ref 6–20)
BUN/CREAT SERPL: 23 (ref 12–20)
CALCIUM SERPL-MCNC: 9.7 MG/DL (ref 8.5–10.1)
CHLORIDE SERPL-SCNC: 107 MMOL/L (ref 97–108)
CHOLEST SERPL-MCNC: 144 MG/DL
CO2 SERPL-SCNC: 28 MMOL/L (ref 21–32)
CREAT SERPL-MCNC: 1 MG/DL (ref 0.55–1.02)
GLUCOSE SERPL-MCNC: 103 MG/DL (ref 65–100)
HDLC SERPL-MCNC: 42 MG/DL
HDLC SERPL: 3.4 (ref 0–5)
LDLC SERPL CALC-MCNC: 58.8 MG/DL (ref 0–100)
POTASSIUM SERPL-SCNC: 4.4 MMOL/L (ref 3.5–5.1)
SODIUM SERPL-SCNC: 140 MMOL/L (ref 136–145)
TRIGL SERPL-MCNC: 216 MG/DL
VLDLC SERPL CALC-MCNC: 43.2 MG/DL

## 2024-07-12 PROCEDURE — 3017F COLORECTAL CA SCREEN DOC REV: CPT

## 2024-07-12 PROCEDURE — 1090F PRES/ABSN URINE INCON ASSESS: CPT

## 2024-07-12 PROCEDURE — 3074F SYST BP LT 130 MM HG: CPT

## 2024-07-12 PROCEDURE — 99214 OFFICE O/P EST MOD 30 MIN: CPT

## 2024-07-12 PROCEDURE — G8427 DOCREV CUR MEDS BY ELIG CLIN: HCPCS

## 2024-07-12 PROCEDURE — 2022F DILAT RTA XM EVC RTNOPTHY: CPT

## 2024-07-12 PROCEDURE — G8399 PT W/DXA RESULTS DOCUMENT: HCPCS

## 2024-07-12 PROCEDURE — G8417 CALC BMI ABV UP PARAM F/U: HCPCS

## 2024-07-12 PROCEDURE — 1123F ACP DISCUSS/DSCN MKR DOCD: CPT

## 2024-07-12 PROCEDURE — 1036F TOBACCO NON-USER: CPT

## 2024-07-12 PROCEDURE — 3078F DIAST BP <80 MM HG: CPT

## 2024-07-12 PROCEDURE — 3046F HEMOGLOBIN A1C LEVEL >9.0%: CPT

## 2024-07-12 RX ORDER — AMLODIPINE BESYLATE 10 MG/1
10 TABLET ORAL DAILY
COMMUNITY
Start: 2024-06-13

## 2024-07-12 RX ORDER — SPIRONOLACTONE 25 MG/1
25 TABLET ORAL DAILY
COMMUNITY
Start: 2024-06-20

## 2024-07-12 NOTE — RESULT ENCOUNTER NOTE
Dear Ms. Sotelo,  Good News!  Your test results look good!  Please continue the current treatment plan.  We can discuss more at your scheduled follow up if you have questions.  Best Regards,  MEHRDAD Escobar NP

## 2024-07-12 NOTE — PROGRESS NOTES
had concerns including Hypertension and Cardiac Clearance.    Vitals:    07/12/24 0924   BP: 128/60   Site: Left Upper Arm   Position: Sitting   Pulse: 86   Resp: 18   SpO2: 97%   Weight: 113.9 kg (251 lb)   Height: 1.6 m (5' 3\")      Surgery TBA Radha Mcintosh at fax [phone] attention Benita    Chest pain No    Refills Nitro        1. Have you been to the ER, urgent care clinic since your last visit? No       Hospitalized since your last visit? No       Where?        Date?  
55%, MV mild annular calcification, TR mild, PAP 35 mmHg       3. LE Doppler    5/10/18- No evidence of right lower extremity vein thrombosis.       4. Cholesterol    10/14/20- , HDL 42, LDL 92,    4/15/21- , HDL 43, LDL 75,    3/29/22- , HDL 42, LDL 78, TG 80  8/9/23- , HDL 43, LDL 62,       5. Coronary CT angio/Ca++ scoring    (6/24/19): 1. Left main originate normally from left coronary cusp and is normal size    without any significant atherosclerotic plaque or calcification.    2. The LAD is normal size with significant tortuosity. There is a mild soft    plaque in the mid LAD without any significant stenosis. There is no significant    calcification. The D1 and D2 diagonal branches were seen without any significant    lesion or calcification. 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    3. The left circumflex is small size vessel with significant tortuosity. There    is no significant lesion or calcification. The OM1 branch is very small without    any significant disease.    4. The RCA is normal size vessel and originate normally from the right coronary    cusp. The RCA is highly tortuous. The mid RCA demonstrate mild soft plaque    without any significant luminal stenosis.       CALCIUM SCORE =0       6. Cardiac Cath    10/7/20-No coronary artery disease detected however there are some tortuosity to her vessels possibly secondary to hypertension          LABORATORY DATA       Lab Results   Component Value Date/Time    WBC 8.9 06/10/2024 05:07 PM    HGB 13.3 06/10/2024 05:07 PM    HCT 38.9 06/10/2024 05:07 PM     06/10/2024 05:07 PM    MCV 90.3 06/10/2024 05:07 PM      Lab Results   Component Value Date/Time     06/10/2024 05:07 PM    K 3.2 06/10/2024 05:07 PM     06/10/2024 05:07 PM    CO2 31 06/10/2024 05:07 PM    BUN 18 06/10/2024 05:07 PM    GFRAA >60 11/29/2020 01:56 PM    GLOB 3.7 06/10/2024 05:07 PM    ALT 25 06/10/2024 05:07 PM

## 2025-02-21 ENCOUNTER — OFFICE VISIT (OUTPATIENT)
Age: 75
End: 2025-02-21
Payer: MEDICARE

## 2025-02-21 VITALS
DIASTOLIC BLOOD PRESSURE: 82 MMHG | HEART RATE: 62 BPM | RESPIRATION RATE: 18 BRPM | BODY MASS INDEX: 47.13 KG/M2 | OXYGEN SATURATION: 99 % | HEIGHT: 63 IN | WEIGHT: 266 LBS | SYSTOLIC BLOOD PRESSURE: 126 MMHG

## 2025-02-21 DIAGNOSIS — G47.33 OBSTRUCTIVE SLEEP APNEA (ADULT) (PEDIATRIC): ICD-10-CM

## 2025-02-21 DIAGNOSIS — I10 ESSENTIAL (PRIMARY) HYPERTENSION: Primary | ICD-10-CM

## 2025-02-21 DIAGNOSIS — E66.01 MORBID (SEVERE) OBESITY DUE TO EXCESS CALORIES: ICD-10-CM

## 2025-02-21 DIAGNOSIS — E78.2 MIXED HYPERLIPIDEMIA: ICD-10-CM

## 2025-02-21 PROCEDURE — 99214 OFFICE O/P EST MOD 30 MIN: CPT | Performed by: INTERNAL MEDICINE

## 2025-02-21 NOTE — PROGRESS NOTES
had concerns including Hypertension.    Vitals:    02/21/25 1256   BP: 126/82   Site: Left Upper Arm   Position: Sitting   Pulse: 62   Resp: 18   SpO2: 99%   Weight: 120.7 kg (266 lb)   Height: 1.6 m (5' 3\")        Chest pain No    Refills No        1. Have you been to the ER, urgent care clinic since your last visit? No       Hospitalized since your last visit? No       Where?        Date?

## 2025-02-21 NOTE — PROGRESS NOTES
Office Follow-up    NAME: Lyla Sotelo   :  1950  MRM:  [id number]    Date:  2025         Assessment and Plan:        1. HTN  Per PCP   -Controlled t  -continue 4 med therapy--- amlodipine 10mg, losartan--25, Coreg 25mg BID, spironolactone 25mg  -labs today     2. Dyslipidemia   -LDL is at goal at 58 in .  Simvastatin - 20 MG     3. Hx Chest discomfort   -Calcium score of 0 in . Repeat in    -Negative negative coronary CT angiogram-    -Normal coronaries cardiac catheterization in      4. ISABELLE   -Wears her CPAP on a regular basis   5.  Diabetes   Per Primary team   6. Obesity  Body mass index is 47.12 kg/m².      Used to see Dr. hWite in past.       See Dr. Mayen in 1 yr.                    ATTENTION:   This medical record was transcribed using an electronic medical records/speech recognition system.  Although proofread, it may and can contain electronic, spelling and other errors.  Corrections may be executed at a later time.  Please feel free to contact us for any clarifications as needed.      Subjective:     Lyla Sotelo, a 74 y.o. year-old who presents for followup.    Exam:     Ht 1.6 m (5' 3\")   Wt 120.7 kg (266 lb)   BMI 47.12 kg/m²      General appearance - alert, well appearing, and in no distress  Mental status - affect appropriate to mood  Eyes - sclera anicteric, moist mucous membranes  Neck - supple, no significant adenopathy    Medications:     Current Outpatient Medications   Medication Sig    spironolactone (ALDACTONE) 25 MG tablet Take 1 tablet by mouth daily    amLODIPine (NORVASC) 10 MG tablet Take 1 tablet by mouth daily    carvedilol (COREG) 25 MG tablet Take 1 tablet by mouth 2 times daily (with meals)    albuterol sulfate HFA (VENTOLIN HFA) 108 (90 Base) MCG/ACT inhaler Inhale 2 puffs into the lungs every 6 hours as needed for Wheezing    aspirin 81 MG EC tablet Take 1 tablet by mouth daily Two tablets daily    acetaminophen (TYLENOL) 500 MG

## 2025-03-04 ENCOUNTER — OFFICE VISIT (OUTPATIENT)
Age: 75
End: 2025-03-04

## 2025-03-04 VITALS
DIASTOLIC BLOOD PRESSURE: 72 MMHG | RESPIRATION RATE: 14 BRPM | SYSTOLIC BLOOD PRESSURE: 127 MMHG | BODY MASS INDEX: 43.41 KG/M2 | HEART RATE: 60 BPM | HEIGHT: 63 IN | OXYGEN SATURATION: 96 % | WEIGHT: 245 LBS | TEMPERATURE: 98.5 F

## 2025-03-04 DIAGNOSIS — J01.90 ACUTE BACTERIAL SINUSITIS: Primary | ICD-10-CM

## 2025-03-04 DIAGNOSIS — B96.89 ACUTE BACTERIAL SINUSITIS: Primary | ICD-10-CM

## 2025-03-04 RX ORDER — DOCOSAHEXAENOIC ACID/EPA 120-180 MG
CAPSULE ORAL
COMMUNITY

## 2025-03-04 RX ORDER — DOXYCYCLINE HYCLATE 100 MG
100 TABLET ORAL 2 TIMES DAILY
Qty: 14 TABLET | Refills: 0 | Status: SHIPPED | OUTPATIENT
Start: 2025-03-04 | End: 2025-03-11

## 2025-03-04 RX ORDER — POTASSIUM CHLORIDE 750 MG/1
TABLET, EXTENDED RELEASE ORAL
COMMUNITY
Start: 2025-01-08

## 2025-03-04 RX ORDER — HYDROCHLOROTHIAZIDE 25 MG/1
25 TABLET ORAL DAILY
COMMUNITY

## 2025-03-04 ASSESSMENT — ENCOUNTER SYMPTOMS
ALLERGIC/IMMUNOLOGIC NEGATIVE: 1
GASTROINTESTINAL NEGATIVE: 1
SINUS PRESSURE: 1
SINUS PAIN: 1
RESPIRATORY NEGATIVE: 1
EYES NEGATIVE: 1

## 2025-03-04 NOTE — PATIENT INSTRUCTIONS
Thank you for visiting [facility] today.    Flonase (over the counter) nasal spray, once a day  Saline nasal sprays 1-3 days   Afrin nasal spray for no longer than 3-5 days  Ibuprofen (400-800 mg) every 8 hours as needed or Tylenol 325-500 mg every 6 hours  Zyrtec,Xyzal,Allegra,or Claritin during the day or Benadryl at night may help with allergies.  You may use the decongestant version of these medications as well.  Simple foods like chicken noodle soup, smoothies, hot tea with lemon and honey may also help  Steam inhalation, humidifier, warm compresses  Increase oral fluids to maintain hydration  Vitamin D 4000 IU each day for one month  1/2 teaspoon turmeric each day for anti-inflammation  Avoid smoking or exposure to smoke and minimize contact with environmental irritants  Antibiotics with food and probiotics     Please follow up with your primary care provider if your symptoms last more than 10 days or worsen.    Please go immediately to the Emergency Department if you develop:  Fever higher than 102F (38.9C), sudden and severe pain in the face and head, trouble seeing or seeing double, trouble thinking clearly, swelling or redness around one or both eyes or a stiff neck

## 2025-03-04 NOTE — PROGRESS NOTES
(hypertension)    Osteopenia    HSV-2 (herpes simplex virus 2) infection            I ADVISED PATIENT TO GO TO ER IF SYMPTOMS WORSEN , CHANGE OR FAILS TO IMPROVE.    I have discussed the diagnosis with the patient and the intended plan as seen in the above orders.  The patient has received an after-visit summary and questions were answered concerning future plans.  I have discussed medication side effects and warnings with the patient as well. The patient agrees and understands above plan.         (Please note that parts of this dictation were completed with voice recognition software. Quite often unanticipated grammatical, syntax, homophones, and other interpretive errors are inadvertently transcribed by the computer software. Efforts were made to edit the dictation but occasionally words remain mis-transcribed.)     An electronic signature was used to authenticate this note.  -- MEHRDAD Go NP

## 2025-08-23 ENCOUNTER — OFFICE VISIT (OUTPATIENT)
Age: 75
End: 2025-08-23

## 2025-08-23 VITALS
SYSTOLIC BLOOD PRESSURE: 107 MMHG | BODY MASS INDEX: 46.99 KG/M2 | HEART RATE: 74 BPM | OXYGEN SATURATION: 97 % | HEIGHT: 63 IN | WEIGHT: 265.2 LBS | RESPIRATION RATE: 18 BRPM | TEMPERATURE: 98.9 F | DIASTOLIC BLOOD PRESSURE: 63 MMHG

## 2025-08-23 DIAGNOSIS — J06.9 URI WITH COUGH AND CONGESTION: Primary | ICD-10-CM

## 2025-08-23 LAB
INFLUENZA A ANTIGEN, POC: NEGATIVE
INFLUENZA B ANTIGEN, POC: NEGATIVE
Lab: NORMAL
PERFORMING INSTRUMENT: NORMAL
QC PASS/FAIL: NORMAL
SARS-COV-2, POC: NORMAL

## 2025-08-23 RX ORDER — FLUTICASONE PROPIONATE 50 MCG
2 SPRAY, SUSPENSION (ML) NASAL DAILY
Qty: 16 G | Refills: 0 | Status: SHIPPED | OUTPATIENT
Start: 2025-08-23

## 2025-08-23 RX ORDER — AZELASTINE 1 MG/ML
1 SPRAY, METERED NASAL 2 TIMES DAILY
Qty: 30 ML | Refills: 0 | Status: SHIPPED | OUTPATIENT
Start: 2025-08-23

## 2025-08-23 RX ORDER — LOSARTAN POTASSIUM AND HYDROCHLOROTHIAZIDE 25; 100 MG/1; MG/1
1 TABLET ORAL DAILY
COMMUNITY

## 2025-08-23 RX ORDER — BENZONATATE 100 MG/1
100 CAPSULE ORAL 3 TIMES DAILY PRN
Qty: 15 CAPSULE | Refills: 0 | Status: SHIPPED | OUTPATIENT
Start: 2025-08-23 | End: 2025-08-28

## (undated) DEVICE — Device

## (undated) DEVICE — CATH DIAG-D 6F MULTI PIG 155 5 -- IMPULSE 16599-302

## (undated) DEVICE — DRESSING HEMOSTATIC SFT INTVENT W/O SLT DBL WRP QUIKCLOT LF

## (undated) DEVICE — SOLIDIFIER MEDC 1200ML -- CONVERT TO 356117

## (undated) DEVICE — KIT COLON W/ 1.1OZ LUB AND 2 END

## (undated) DEVICE — ELECTRODE,RADIOTRANSLUCENT,FOAM,3PK: Brand: MEDLINE

## (undated) DEVICE — SYRINGE ANGIO 12ML COR CTRL FIX M LUER CONN RNG GRP SLD RNG

## (undated) DEVICE — PINNACLE INTRODUCER SHEATH: Brand: PINNACLE

## (undated) DEVICE — PROCEDURE KIT FLUID MGMT CUST MAINFOLD STRL

## (undated) DEVICE — SNARE ENDOSCP M L240CM W27MM SHTH DIA2.4MM CHN 2.8MM OVL

## (undated) DEVICE — BAG BELONG PT PERS CLEAR HANDL

## (undated) DEVICE — SET ADMIN 16ML TBNG L100IN 2 Y INJ SITE IV PIGGY BK DISP

## (undated) DEVICE — NDL PERC VASC ACC 18GX2.75I --

## (undated) DEVICE — CONTAINER SPEC 20 ML LID NEUT BUFF FORMALIN 10 % POLYPR STS

## (undated) DEVICE — BAG SPEC BIOHZRD 10 X 10 IN --

## (undated) DEVICE — POLYP TRAP: Brand: TRAPEASE®

## (undated) DEVICE — FIXED CORE WIRE GUIDE SAFE-T-J, CURVED: Brand: COOK

## (undated) DEVICE — ADULT SPO2 SENSOR: Brand: NELLCOR

## (undated) DEVICE — 1200 GUARD II KIT W/5MM TUBE W/O VAC TUBE: Brand: GUARDIAN

## (undated) DEVICE — CATH IV AUTOGRD BC PNK 20GA 25 -- INSYTE

## (undated) DEVICE — PACK PROCEDURE SURG HRT CATH

## (undated) DEVICE — SYRINGE 50ML E/T